# Patient Record
Sex: MALE | Race: WHITE | Employment: OTHER | URBAN - METROPOLITAN AREA
[De-identification: names, ages, dates, MRNs, and addresses within clinical notes are randomized per-mention and may not be internally consistent; named-entity substitution may affect disease eponyms.]

---

## 2018-08-26 ENCOUNTER — HOSPITAL ENCOUNTER (OUTPATIENT)
Age: 33
Discharge: HOME | End: 2018-08-26
Payer: MEDICARE

## 2018-08-26 LAB
ALBUMIN SERPL-MCNC: 4.7 G/DL (ref 3.5–5.2)
ALP BLD-CCNC: 94 U/L (ref 40–129)
ALT SERPL-CCNC: 13 U/L (ref 0–40)
ANION GAP SERPL CALCULATED.3IONS-SCNC: 12 MMOL/L (ref 7–16)
AST SERPL-CCNC: 21 U/L (ref 0–39)
BASOPHILS ABSOLUTE: 0.02 E9/L (ref 0–0.2)
BASOPHILS RELATIVE PERCENT: 0.2 % (ref 0–2)
BILIRUB SERPL-MCNC: 0.5 MG/DL (ref 0–1.2)
BUN BLDV-MCNC: 8 MG/DL (ref 6–20)
CALCIUM SERPL-MCNC: 10.5 MG/DL (ref 8.6–10.2)
CHLORIDE BLD-SCNC: 100 MMOL/L (ref 98–107)
CO2: 30 MMOL/L (ref 22–29)
CREAT SERPL-MCNC: 0.6 MG/DL (ref 0.7–1.2)
EOSINOPHILS ABSOLUTE: 0.02 E9/L (ref 0.05–0.5)
EOSINOPHILS RELATIVE PERCENT: 0.2 % (ref 0–6)
GFR AFRICAN AMERICAN: >60
GFR NON-AFRICAN AMERICAN: >60 ML/MIN/1.73
GLUCOSE BLD-MCNC: 101 MG/DL (ref 74–109)
HCT VFR BLD CALC: 46.2 % (ref 37–54)
HEMOGLOBIN: 15.8 G/DL (ref 12.5–16.5)
IMMATURE GRANULOCYTES #: 0.03 E9/L
IMMATURE GRANULOCYTES %: 0.2 % (ref 0–5)
LYMPHOCYTES ABSOLUTE: 1.4 E9/L (ref 1.5–4)
LYMPHOCYTES RELATIVE PERCENT: 11.2 % (ref 20–42)
MCH RBC QN AUTO: 34.1 PG (ref 26–35)
MCHC RBC AUTO-ENTMCNC: 34.2 % (ref 32–34.5)
MCV RBC AUTO: 99.8 FL (ref 80–99.9)
MONOCYTES ABSOLUTE: 1.15 E9/L (ref 0.1–0.95)
MONOCYTES RELATIVE PERCENT: 9.2 % (ref 2–12)
NEUTROPHILS ABSOLUTE: 9.91 E9/L (ref 1.8–7.3)
NEUTROPHILS RELATIVE PERCENT: 79 % (ref 43–80)
PDW BLD-RTO: 12.3 FL (ref 11.5–15)
PLATELET # BLD: 132 E9/L (ref 130–450)
PMV BLD AUTO: 13 FL (ref 7–12)
POTASSIUM SERPL-SCNC: 4.6 MMOL/L (ref 3.5–5)
RBC # BLD: 4.63 E12/L (ref 3.8–5.8)
SODIUM BLD-SCNC: 142 MMOL/L (ref 132–146)
TOTAL PROTEIN: 8.7 G/DL (ref 6.4–8.3)
VALPROIC ACID LEVEL: 86 MCG/ML (ref 50–100)
WBC # BLD: 12.5 E9/L (ref 4.5–11.5)

## 2018-08-26 PROCEDURE — 80168 ASSAY OF ETHOSUXIMIDE: CPT

## 2018-08-26 PROCEDURE — 80053 COMPREHEN METABOLIC PANEL: CPT

## 2018-08-26 PROCEDURE — 36415 COLL VENOUS BLD VENIPUNCTURE: CPT

## 2018-08-26 PROCEDURE — 80164 ASSAY DIPROPYLACETIC ACD TOT: CPT

## 2018-08-26 PROCEDURE — 85025 COMPLETE CBC W/AUTO DIFF WBC: CPT

## 2019-01-09 ENCOUNTER — HOSPITAL ENCOUNTER (EMERGENCY)
Age: 34
Discharge: HOME | End: 2019-01-09
Payer: MEDICARE

## 2019-01-09 VITALS
OXYGEN SATURATION: 98 % | TEMPERATURE: 97.8 F | DIASTOLIC BLOOD PRESSURE: 80 MMHG | SYSTOLIC BLOOD PRESSURE: 110 MMHG | RESPIRATION RATE: 16 BRPM | WEIGHT: 88 LBS | HEART RATE: 96 BPM | BODY MASS INDEX: 17.19 KG/M2

## 2019-01-09 DIAGNOSIS — R33.9 URINARY RETENTION: Primary | ICD-10-CM

## 2019-01-09 LAB
BILIRUBIN URINE: NEGATIVE
BLOOD, URINE: NEGATIVE
CLARITY: CLEAR
COLOR: YELLOW
GLUCOSE URINE: NEGATIVE MG/DL
KETONES, URINE: NEGATIVE MG/DL
LEUKOCYTE ESTERASE, URINE: NEGATIVE
NITRITE, URINE: NEGATIVE
PH UA: 5.5 (ref 5–9)
PROTEIN UA: NEGATIVE MG/DL
SPECIFIC GRAVITY UA: >=1.03 (ref 1–1.03)
UROBILINOGEN, URINE: 0.2 E.U./DL

## 2019-01-09 PROCEDURE — 81003 URINALYSIS AUTO W/O SCOPE: CPT

## 2019-01-09 PROCEDURE — 99283 EMERGENCY DEPT VISIT LOW MDM: CPT

## 2019-01-09 PROCEDURE — 87088 URINE BACTERIA CULTURE: CPT

## 2019-01-11 LAB — URINE CULTURE, ROUTINE: NORMAL

## 2019-01-12 PROBLEM — R33.9 URINARY RETENTION: Status: ACTIVE | Noted: 2019-01-12

## 2019-08-19 ENCOUNTER — HOSPITAL ENCOUNTER (OUTPATIENT)
Age: 34
Discharge: HOME | End: 2019-08-19
Payer: MEDICARE

## 2019-08-19 LAB — VALPROIC ACID LEVEL: 98 MCG/ML (ref 50–100)

## 2019-08-19 PROCEDURE — 80164 ASSAY DIPROPYLACETIC ACD TOT: CPT

## 2019-08-19 PROCEDURE — 36415 COLL VENOUS BLD VENIPUNCTURE: CPT

## 2019-08-19 PROCEDURE — 80168 ASSAY OF ETHOSUXIMIDE: CPT

## 2019-08-21 DIAGNOSIS — G80.8 OTHER CEREBRAL PALSY (HCC): Primary | ICD-10-CM

## 2019-10-07 ENCOUNTER — TELEPHONE (OUTPATIENT)
Dept: PRIMARY CARE CLINIC | Age: 34
End: 2019-10-07

## 2019-12-09 ENCOUNTER — OFFICE VISIT (OUTPATIENT)
Dept: PRIMARY CARE CLINIC | Age: 34
End: 2019-12-09
Payer: MEDICARE

## 2019-12-09 VITALS — SYSTOLIC BLOOD PRESSURE: 120 MMHG | DIASTOLIC BLOOD PRESSURE: 72 MMHG

## 2019-12-09 DIAGNOSIS — Q43.1 HIRSCHSPRUNG'S DISEASE: ICD-10-CM

## 2019-12-09 DIAGNOSIS — F72 SEVERE INTELLECTUAL DISABILITIES: ICD-10-CM

## 2019-12-09 DIAGNOSIS — G80.8 OTHER CEREBRAL PALSY (HCC): ICD-10-CM

## 2019-12-09 DIAGNOSIS — R13.19 OTHER DYSPHAGIA: ICD-10-CM

## 2019-12-09 DIAGNOSIS — Q24.3 PULMONARY INFUNDIBULAR STENOSIS: ICD-10-CM

## 2019-12-09 DIAGNOSIS — R21 RASH: ICD-10-CM

## 2019-12-09 DIAGNOSIS — G40.909 SEIZURE DISORDER (HCC): Primary | ICD-10-CM

## 2019-12-09 DIAGNOSIS — D69.6 THROMBOCYTOPENIA, UNSPECIFIED (HCC): ICD-10-CM

## 2019-12-09 DIAGNOSIS — Z00.00 HEALTH MAINTENANCE EXAMINATION: ICD-10-CM

## 2019-12-09 PROBLEM — R56.9 UNSPECIFIED CONVULSIONS (HCC): Status: ACTIVE | Noted: 2019-12-09

## 2019-12-09 PROCEDURE — G8419 CALC BMI OUT NRM PARAM NOF/U: HCPCS | Performed by: FAMILY MEDICINE

## 2019-12-09 PROCEDURE — 99215 OFFICE O/P EST HI 40 MIN: CPT | Performed by: FAMILY MEDICINE

## 2019-12-09 PROCEDURE — 4004F PT TOBACCO SCREEN RCVD TLK: CPT | Performed by: FAMILY MEDICINE

## 2019-12-09 PROCEDURE — G8484 FLU IMMUNIZE NO ADMIN: HCPCS | Performed by: FAMILY MEDICINE

## 2019-12-09 PROCEDURE — G8427 DOCREV CUR MEDS BY ELIG CLIN: HCPCS | Performed by: FAMILY MEDICINE

## 2019-12-09 RX ORDER — POTASSIUM CHLORIDE 10 MEQ
5 TABLET, EXTENDED RELEASE ORAL DAILY
COMMUNITY

## 2019-12-09 RX ORDER — ETHOSUXIMIDE 250 MG/5ML
7.5 SOLUTION ORAL 2 TIMES DAILY
COMMUNITY

## 2019-12-09 RX ORDER — ESOMEPRAZOLE MAGNESIUM 20 MG/1
20 FOR SUSPENSION ORAL DAILY
COMMUNITY
End: 2019-12-16 | Stop reason: SDUPTHER

## 2019-12-09 RX ORDER — ALOE VERA/COLLAGEN
FOAM (ML) TOPICAL
COMMUNITY

## 2019-12-16 RX ORDER — ESOMEPRAZOLE MAGNESIUM 20 MG/1
FOR SUSPENSION ORAL
Qty: 30 PACKET | Refills: 12 | Status: SHIPPED
Start: 2019-12-16 | End: 2021-01-14 | Stop reason: SDUPTHER

## 2020-01-08 PROBLEM — Z00.00 HEALTH MAINTENANCE EXAMINATION: Status: RESOLVED | Noted: 2019-12-09 | Resolved: 2020-01-08

## 2020-01-25 ENCOUNTER — HOSPITAL ENCOUNTER (OUTPATIENT)
Age: 35
Discharge: HOME | End: 2020-01-25
Payer: MEDICARE

## 2020-01-25 LAB
ALBUMIN SERPL-MCNC: 4.8 G/DL (ref 3.5–5.2)
ALP BLD-CCNC: 100 U/L (ref 40–129)
ALT SERPL-CCNC: 12 U/L (ref 0–40)
ANION GAP SERPL CALCULATED.3IONS-SCNC: 11 MMOL/L (ref 7–16)
AST SERPL-CCNC: 22 U/L (ref 0–39)
BASOPHILS ABSOLUTE: 0.04 E9/L (ref 0–0.2)
BASOPHILS RELATIVE PERCENT: 0.5 % (ref 0–2)
BILIRUB SERPL-MCNC: 0.3 MG/DL (ref 0–1.2)
BUN BLDV-MCNC: 10 MG/DL (ref 6–20)
CALCIUM SERPL-MCNC: 10.6 MG/DL (ref 8.6–10.2)
CHLORIDE BLD-SCNC: 101 MMOL/L (ref 98–107)
CHOLESTEROL, TOTAL: 119 MG/DL (ref 0–199)
CO2: 32 MMOL/L (ref 22–29)
CREAT SERPL-MCNC: 0.6 MG/DL (ref 0.7–1.2)
EOSINOPHILS ABSOLUTE: 0.04 E9/L (ref 0.05–0.5)
EOSINOPHILS RELATIVE PERCENT: 0.5 % (ref 0–6)
GFR AFRICAN AMERICAN: >60
GFR NON-AFRICAN AMERICAN: >60 ML/MIN/1.73
GLUCOSE BLD-MCNC: 93 MG/DL (ref 74–99)
HCT VFR BLD CALC: 45.6 % (ref 37–54)
HDLC SERPL-MCNC: 52 MG/DL
HEMOGLOBIN: 15.2 G/DL (ref 12.5–16.5)
IMMATURE GRANULOCYTES #: 0.02 E9/L
IMMATURE GRANULOCYTES %: 0.3 % (ref 0–5)
LDL CHOLESTEROL CALCULATED: 49 MG/DL (ref 0–99)
LYMPHOCYTES ABSOLUTE: 3.18 E9/L (ref 1.5–4)
LYMPHOCYTES RELATIVE PERCENT: 41.5 % (ref 20–42)
MCH RBC QN AUTO: 33.5 PG (ref 26–35)
MCHC RBC AUTO-ENTMCNC: 33.3 % (ref 32–34.5)
MCV RBC AUTO: 100.4 FL (ref 80–99.9)
MONOCYTES ABSOLUTE: 1.08 E9/L (ref 0.1–0.95)
MONOCYTES RELATIVE PERCENT: 14.1 % (ref 2–12)
NEUTROPHILS ABSOLUTE: 3.3 E9/L (ref 1.8–7.3)
NEUTROPHILS RELATIVE PERCENT: 43.1 % (ref 43–80)
PDW BLD-RTO: 12.3 FL (ref 11.5–15)
PLATELET # BLD: 131 E9/L (ref 130–450)
PMV BLD AUTO: 13.6 FL (ref 7–12)
POTASSIUM SERPL-SCNC: 4.9 MMOL/L (ref 3.5–5)
RBC # BLD: 4.54 E12/L (ref 3.8–5.8)
SODIUM BLD-SCNC: 144 MMOL/L (ref 132–146)
T4 FREE: 1.04 NG/DL (ref 0.93–1.7)
TOTAL PROTEIN: 8 G/DL (ref 6.4–8.3)
TRIGL SERPL-MCNC: 89 MG/DL (ref 0–149)
TSH SERPL DL<=0.05 MIU/L-ACNC: 2.38 UIU/ML (ref 0.27–4.2)
VLDLC SERPL CALC-MCNC: 18 MG/DL
WBC # BLD: 7.7 E9/L (ref 4.5–11.5)

## 2020-01-25 PROCEDURE — 85025 COMPLETE CBC W/AUTO DIFF WBC: CPT

## 2020-01-25 PROCEDURE — 84443 ASSAY THYROID STIM HORMONE: CPT

## 2020-01-25 PROCEDURE — 80053 COMPREHEN METABOLIC PANEL: CPT

## 2020-01-25 PROCEDURE — 36415 COLL VENOUS BLD VENIPUNCTURE: CPT

## 2020-01-25 PROCEDURE — 80061 LIPID PANEL: CPT

## 2020-01-25 PROCEDURE — 84439 ASSAY OF FREE THYROXINE: CPT

## 2020-01-27 NOTE — RESULT ENCOUNTER NOTE
Calcium mildly elevated , as is mcv. Repeat bw . Could be a hydration issue with calcium. b12 def can cause elevated mcv. Repeat bw/additional bw if willing. Notify if sxs.

## 2020-02-07 ENCOUNTER — TELEPHONE (OUTPATIENT)
Dept: PRIMARY CARE CLINIC | Age: 35
End: 2020-02-07

## 2020-02-10 ENCOUNTER — HOSPITAL ENCOUNTER (OUTPATIENT)
Age: 35
Discharge: HOME | End: 2020-02-12
Payer: MEDICARE

## 2020-02-10 ENCOUNTER — HOSPITAL ENCOUNTER (OUTPATIENT)
Dept: GENERAL RADIOLOGY | Age: 35
Discharge: HOME | End: 2020-02-12
Payer: MEDICARE

## 2020-02-10 ENCOUNTER — OFFICE VISIT (OUTPATIENT)
Dept: PRIMARY CARE CLINIC | Age: 35
End: 2020-02-10
Payer: MEDICARE

## 2020-02-10 VITALS — RESPIRATION RATE: 16 BRPM | DIASTOLIC BLOOD PRESSURE: 80 MMHG | SYSTOLIC BLOOD PRESSURE: 126 MMHG | TEMPERATURE: 98.7 F

## 2020-02-10 PROBLEM — R05.9 COUGH: Status: ACTIVE | Noted: 2020-02-10

## 2020-02-10 PROBLEM — E83.42 HYPOMAGNESEMIA: Status: ACTIVE | Noted: 2020-02-10

## 2020-02-10 PROBLEM — D75.89 MACROCYTOSIS: Status: ACTIVE | Noted: 2020-02-10

## 2020-02-10 PROBLEM — R11.10 DRY HEAVES: Status: ACTIVE | Noted: 2020-02-10

## 2020-02-10 PROBLEM — R33.9 URINARY RETENTION: Status: ACTIVE | Noted: 2020-02-10

## 2020-02-10 PROBLEM — R41.82 ALTERED MENTAL STATUS: Status: ACTIVE | Noted: 2020-02-10

## 2020-02-10 PROBLEM — K43.5 PARASTOMAL HERNIA WITHOUT OBSTRUCTION OR GANGRENE: Status: ACTIVE | Noted: 2020-02-10

## 2020-02-10 PROBLEM — E83.52 HYPERCALCEMIA: Status: ACTIVE | Noted: 2020-02-10

## 2020-02-10 PROCEDURE — G8427 DOCREV CUR MEDS BY ELIG CLIN: HCPCS | Performed by: FAMILY MEDICINE

## 2020-02-10 PROCEDURE — 71046 X-RAY EXAM CHEST 2 VIEWS: CPT

## 2020-02-10 PROCEDURE — G8421 BMI NOT CALCULATED: HCPCS | Performed by: FAMILY MEDICINE

## 2020-02-10 PROCEDURE — 1036F TOBACCO NON-USER: CPT | Performed by: FAMILY MEDICINE

## 2020-02-10 PROCEDURE — 99215 OFFICE O/P EST HI 40 MIN: CPT | Performed by: FAMILY MEDICINE

## 2020-02-10 PROCEDURE — G8484 FLU IMMUNIZE NO ADMIN: HCPCS | Performed by: FAMILY MEDICINE

## 2020-02-10 RX ORDER — IODINE/SODIUM IODIDE 2 %
TINCTURE TOPICAL
COMMUNITY
Start: 2019-12-27 | End: 2020-02-12 | Stop reason: SDUPTHER

## 2020-02-10 ASSESSMENT — PATIENT HEALTH QUESTIONNAIRE - PHQ9
SUM OF ALL RESPONSES TO PHQ QUESTIONS 1-9: 0
2. FEELING DOWN, DEPRESSED OR HOPELESS: 0
1. LITTLE INTEREST OR PLEASURE IN DOING THINGS: 0
SUM OF ALL RESPONSES TO PHQ QUESTIONS 1-9: 0
SUM OF ALL RESPONSES TO PHQ9 QUESTIONS 1 & 2: 0

## 2020-02-10 NOTE — PROGRESS NOTES
19  Lucy García : 1985 Sex: male  Age: 30 y.o. Chief Complaint   Patient presents with    Urinary Retention    Fatigue         HPI:       CC: Patient presents for fu sxs and review blood work      HPI: Here with mom and caretaker. Patient developed symptoms of lethargy, was not acting right, was dry heaving after G-tube feeds, does not have emesis with history of Nissen's fundoplication. Acted tired. Seem to be in urinary retention. No known fever. Instructed go to the ER. They did not. This saw the urologist who has a renal ultrasound pending and a UA/culture pending but difficulty getting the sample. Cath option reviewed-declines now. All of his symptoms seem back to baseline. He is acting himself. He does have a intermittent cough in the office today but they feel he does this more to \"joke around\". He is n.p.o. He seems to be tolerating his feeds well. Urination seems back to baseline. Cough is nonproductive. No other complaints or concerns this time and again seems back to baseline    Mom thinks she \"freaked the phone girl out\" when she stated he was lethargic although she does describe him to sound lethargic at the time. I also discussed with his nonverbal status and other medical conditions that if he is \"not acting right\" he should be assessed through the ER with a large differential where they could do chest imaging relevant ammonia, UA and culture to rule out UTI, retention, blood work etc. and she understands    Blood work reviewed. CO2 32, calcium went from 10.5-10. 6. He is on [employer]" twice a day. They are not sure the dosing. They are going to back it down to once a day and repeat BMP in 1 to 2 weeks. HDL 52 LDL 49 triglyceride 89 LFTs normal TSH 2.380, free T4 1.04,CBC grossly normal except .4,      Mom says his stoma appears \"puffy\" around it-declines examination today. Again all acute symptoms resolved.   In hindsight she thinks possibly a viral inspection. Eyes: No discharge from the eyes. Sclerae clear. ENMT: Ears clear nose clear rhinorrhea oropharynx MMM  Neck: Supple. Palpation reveals no adenopathy. No masses appreciated. No JVD. Resp: Respirations are unlabored. Clear to auscultation. No rales, rhonchi or wheezes  appreciated over the lungs bilaterally. CV: Rate is regular. Rhythm is regular. Extremities: No clubbing or cyanosis. No edema of the lower limbs bilaterally. No calf  inflammation or tenderness. Abdomen: Bowel sounds are normoactive. Abdomen is soft, nontender, and nondistended. No  abdominal masses. No palpable hepatosplenomegaly. G-tube without surrounding irritation. Skin: Dry and warm with no rash. Neuro: without acute change. Braces both legs. Nonverbal.      Assessment and Plan:   Diagnosis Orders   1. Hypercalcemia  Basic Metabolic Panel    Comprehensive Metabolic Panel   2. Cough  XR CHEST STANDARD (2 VW)   3. Dry heaves     4. Urinary retention     5. Altered mental status, unspecified altered mental status type  XR CHEST STANDARD (2 VW)   6. Other cerebral palsy (HCC)  Comprehensive Metabolic Panel    XR CHEST STANDARD (2 VW)   7. Hirschsprung's disease     8. Pulmonary infundibular stenosis     9. Thrombocytopenia, unspecified (HCC)  CBC Auto Differential   10. Seizure disorder (HCC)  Comprehensive Metabolic Panel   11. Other dysphagia  XR CHEST STANDARD (2 VW)   12. Severe intellectual disabilities  Comprehensive Metabolic Panel   13. Macrocytosis  Vitamin B12 & Folate    CBC Auto Differential   14. Hypomagnesemia  Magnesium    Magnesium   15. Parastomal hernia without obstruction or gangrene  Ambulatory referral to General Surgery   16. Rash         Other cerebral palsy ([facility]  Counseled. Risk of complications reviewed. Care appears to be excellent. Forms completed. Hirschsprung's disease  He no longer sees GI. Mom has some questions on his stoma. We will refer Dr. Lorena Ware.   Pulmonary infundibular insurance company to ensure coverage and to fully understand benefits and cost prior to any testing. This note was created with the assistance of voice recognition software. Document was reviewed however may contain grammatical errors. 19  Dani Landeros : 1985 Sex: male  Age: 30 y.o. Chief Complaint   Patient presents with    Urinary Retention    Fatigue         HPI:       CC: Patient presents for Dana Jc  HPI: Here with mom. Does have guardianship forms today. No acute symptoms. Doing well overall. chronic mild cough. no change. overall stable. Skin around G-tube cleared instantly on the compounded formula as below. Mom uses it as needed. Did not respond to cornstarch or nystatin. mom says cleared from pul stanpoint. not intested in imaging at this time. , , No acute symptoms. Has  Is NPO. G-tube feeds only. Follows with [facility] neurology. Had some breakthrough seizures this summer, recent levels normal and follow with neurology. No shortness of breath or  wheezing. No fever or chills. Declines Flu shot counseled on recommendations as well as other vaccines. Had Tdap . Had Pneumovax  and we did discuss booster as well as Prevnar 13  the Pneumovax and Prevnar 13 x 1 year.  had chicken pox. Does not see  Dr. Damaris Calhoun, gastrointestinal at [facility] any longer. Saw Cardiology at [facility] , said no Pulmonary stenosis despite being told that his whole life, no cardiac issues, fu prn. ? Trouble visualizing. .Nonverbal , so  difficult to assess in that respect, but no acute changes or concerns per Mom. Appears to be providing  excellent care. Mom will look into shot record for dates of childhood immunizations and provide this to us as well. Has 6 cans of nutrition per day through the G-tube      GERD no acute symptoms.   Does not seem to have any symptomatology consistent with headaches or acute neurologic deficit, does not portray symptoms of shortness of breath, chest pain, abdominal discomfort, vomiting, no change in bowels, no melena or hematochezia. .  Had some change in urinary habits, difficulty going but it was negatively assessed by Dr. Jessie Lake with a negative ultrasound and doing well at this time. No gross hematuria. No rash or skin lesions. Past Medical History: Again, extensive including cerebral palsy, profound mental retardation, nonver bal,  history of Hirschsprung's Disease, colostomy, feeding tube, seizure disorder, history of valvular disease,  mild pulmonic stenosis (Neg 2017 but difficult exam), chronic allergic rhinitis, thrombocytopenia,  hypercalcemia, lympho cytosis, macrocytosis. [address]: Dad  lymphoma/pneumonia 66    Social: living with guardian and forms filled out today.             Current Outpatient Medications:     Incontinence Supply Disposable ([employer] FITTED BRIEFS DAY/NIGHT MD) MISC, USE AS DIRECTED, Disp: , Rfl:     esomeprazole Magnesium (NEXIUM) 20 MG PACK, 1 per g tube every day, Disp: 30 packet, Rfl: 12    Ostomy Supplies (STOMAHESIVE PROTECTIVE) POWD, by Other route Apply to affected area of stoma, Disp: , Rfl:     Zinc Oxide (BALMEX EX), Apply topically Apply to christal site, Disp: , Rfl:     Hydrocortisone (PREPARATION H EX), Apply topically Twice a day as needed, Disp: , Rfl:     Oral Electrolytes (PEDIALYTE PO), Take by mouth prn, Disp: , Rfl:     Nutritional Supplements (ENSURE PO), Take by mouth 6 cans a day, Disp: , Rfl:     loratadine 5 MG/5ML solution, Take 5 mg by mouth daily 10 ml GT QD, Disp: , Rfl:     valproate (DEPAKENE) 250 MG/5ML solution, Take by mouth 3 times daily 5.0 ml tid, Disp: , Rfl:     ethosuximide (ZARONTIN) 250 MG/5ML solution, Take 7.5 mLs by mouth 2 times daily, Disp: , Rfl:     Calcium Carb-Cholecalciferol (CALCIUM-VITAMIN D) 500-200 MG-UNIT per tablet, Take 1 tablet by mouth 2 times daily (with meals), Disp: , Rfl:   Allergies   Allergen Reactions    Lactose     Lactose Intolerance (Gi)     Other     Seasonal        Past Medical History:   Diagnosis Date    Cerebral palsy ([address]     Hirschsprung disease     Hypospadias     Mental retardation     Pulmonic stenosis      Past Surgical History:   Procedure Laterality Date    COLOSTOMY      FOOT SURGERY      GASTROSTOMY TUBE PLACEMENT      PATENT DUCTUS ARTERIOUS LIGATION      TESTICLE REMOVAL       No family history on file. Social History     Tobacco Use    Smoking status: Never Smoker    Smokeless tobacco: Never Used   Substance Use Topics    Alcohol use: No    Drug use: No      Social History     Patient does not qualify to have social determinant information on file (likely too young). Social History Narrative    PMH:    Problem List: Infantile cerebral palsy, Congenital heart disease, Thrombocytopenic disorder, Seizure,    Severe mental retardation (I.Q. 20-34)         cerebral palsy, profound mental retardation, nonverbal,    history of Hirschsprung's Disease, colostomy, feeding tube, seizure disorder, history of valvular disease,    mild pulmonic stenosis (recent neg but difficult exam), chronic allergic rhinitis, thrombocytopenia,    hypercalcemia, lympho cytosis, macrocytosis. [address]: Dad  lymphoma/pneumonia 66        SH:    . (Marital)reviewed    Personal Habits: Cigarette Use: Nonsmoker. . (Alcohol)        Vitals:    02/10/20 1404   BP: 126/80   Resp: 16   Temp: 98.7 °F (37.1 °C)   TempSrc: Temporal   Weight: Comment: pt in wheelchair      Wt Readings from Last 3 Encounters:   19 88 lb (39.9 kg)   03/10/17 88 lb (39.9 kg)            Exam:  Const: Appears comfortable. No signs of acute distress present. Head/Face: Atraumatic on inspection. Eyes: No discharge from the eyes. Sclerae clear. ENMT: Ears clear nose clear rhinorrhea oropharynx MMM  Neck: Supple. Palpation reveals no adenopathy. No masses appreciated. No JVD. Resp: Respirations are unlabored. Clear to auscultation.  No rales, rhonchi or wheezes  appreciated over the lungs bilaterally. CV: Rate is regular. Rhythm is regular. Extremities: No clubbing or cyanosis. No edema of the lower limbs bilaterally. No calf  inflammation or tenderness. Abdomen: Bowel sounds are normoactive. Abdomen is soft, nontender, and nondistended. No  abdominal masses. No palpable hepatosplenomegaly. G-tube . Skin: Dry and warm with no rash. Neuro: without acute change. Braces both legs. Nonverbal.      Assessment and Plan:   Diagnosis Orders   1. Hypercalcemia  Basic Metabolic Panel    Comprehensive Metabolic Panel   2. Cough  XR CHEST STANDARD (2 VW)   3. Dry heaves     4. Urinary retention     5. Altered mental status, unspecified altered mental status type  XR CHEST STANDARD (2 VW)   6. Other cerebral palsy (HCC)  Comprehensive Metabolic Panel    XR CHEST STANDARD (2 VW)   7. Hirschsprung's disease     8. Pulmonary infundibular stenosis     9. Thrombocytopenia, unspecified (HCC)  CBC Auto Differential   10. Seizure disorder ([facility])  Comprehensive Metabolic Panel   11. Other dysphagia  XR CHEST STANDARD (2 VW)   12. Severe intellectual disabilities  Comprehensive Metabolic Panel   13. Macrocytosis  Vitamin B12 & Folate    CBC Auto Differential   14. Hypomagnesemia  Magnesium    Magnesium   15. Parastomal hernia without obstruction or gangrene  Ambulatory referral to General Surgery   16. Rash         Other cerebral palsy ([address]  Counseled. Risk of complications reviewed. Care appears to be excellent. Forms completed. Hirschsprung's disease  He should continue per GI    Pulmonary infundibular stenosis  Pulmonary stenosis. Continue per cardiology. Last echo apparently did not show it,  but mom not sure that they got a clear picture. Thrombocytopenia, unspecified ([address]  Has been stable. Seizure disorder ([address]  Continue per neurology. Mom defers levels to them. Tolerating medications. ADRs interactions reviewed. Seizure precautions.     Other dysphagia  NPO. Has G-tube    Health maintenance examination  Counseled  12/19. Rash  Chronic intermittent irritation around the G-tube but does very well with compound cream through: Pharmacy including Maalox/Aquaphor/zinc oxide/nystatin/0.5% hydrocortisone, not for long-term use, may use 1 to 2 weeks on/1 week off as needed no current symptoms of the     Cough  Counseled extensively. Differential reviewed, including serious etiologies. He is n.p.o. Had Nissen's fundoplication. Still discussed risk of aspiration and other pulmonary causes. They think he is \"joking around\"-they are agreeable to a chest x-ray at another facility, but declines otherwise. Dry heaves  Resolved. They feel possibly viral gastroenteritis as it was self-limited    Urinary retention  Seems back to baseline. Follows with Dr. Alexandre Hill. Has renal ultrasound pending. Has UA/culture pending. Declines cath sample. Altered mental status  Back to baseline. Other than as noted declines otherwise now. Macrocytosis  Check X62 folic acid with next blood work. Hypomagnesemia  On \"cow mag\" twice a day-lowered to once a day because of hypercalcemia. Monitor. Parastomal hernia without obstruction or gangrene  Declines examination now. Would like refer Dr. Jose D Tyler to rule out parastomal hernia versus otherwise. No flowsheet data found. Plan as above. Counseled extensively and differential diagnoses relevant to above were reviewed, including serious etiologies. Side effects and interactions of medications were reviewed. Check chest x-ray. -Decrease Hua mag from twice a day to once a day  -Check BMP magnesium V57 and folic acid in 1 to 2 weeks  -Check CBC BMP magnesium in 3 months as well as parathyroid, ionized calcium vitamin B12 vitamin D and follow-up then sooner as needed  -Await renal ultrasound to continue with Dr. Alexandre Hill  -Refer to Dr. Christos Mariscal  -Low threshold for ER. Precautions reviewed.   Unfortunately with

## 2020-02-10 NOTE — ASSESSMENT & PLAN NOTE
Declines examination now. Would like refer Dr. Caron Byers to rule out parastomal hernia versus otherwise.

## 2020-02-10 NOTE — ASSESSMENT & PLAN NOTE
Seems back to baseline. Follows with Dr. Michelle Collins. Has renal ultrasound pending. Has UA/culture pending. Declines cath sample.

## 2020-02-11 ENCOUNTER — TELEPHONE (OUTPATIENT)
Dept: SURGERY | Age: 35
End: 2020-02-11

## 2020-02-11 NOTE — TELEPHONE ENCOUNTER
1st attempt to make an appointment for a referral to Dr. Anabelle Winters. Left a message with contact information to call the office back.     Electronically signed by Araceli Araiza on 2/11/20 at 9:27 AM

## 2020-02-12 RX ORDER — IODINE/SODIUM IODIDE 2 %
TINCTURE TOPICAL
Qty: 200 EACH | Refills: 12 | Status: SHIPPED
Start: 2020-02-12 | End: 2022-06-06 | Stop reason: SDUPTHER

## 2020-02-20 ENCOUNTER — OFFICE VISIT (OUTPATIENT)
Dept: SURGERY | Age: 35
End: 2020-02-20
Payer: MEDICARE

## 2020-02-20 PROCEDURE — 1036F TOBACCO NON-USER: CPT | Performed by: SURGERY

## 2020-02-20 PROCEDURE — G8484 FLU IMMUNIZE NO ADMIN: HCPCS | Performed by: SURGERY

## 2020-02-20 PROCEDURE — G8421 BMI NOT CALCULATED: HCPCS | Performed by: SURGERY

## 2020-02-20 PROCEDURE — G8427 DOCREV CUR MEDS BY ELIG CLIN: HCPCS | Performed by: SURGERY

## 2020-02-20 PROCEDURE — 99203 OFFICE O/P NEW LOW 30 MIN: CPT | Performed by: SURGERY

## 2020-02-27 ENCOUNTER — TELEPHONE (OUTPATIENT)
Dept: SURGERY | Age: 35
End: 2020-02-27

## 2020-02-27 NOTE — TELEPHONE ENCOUNTER
MA called [facility] to get the CT Abd/Pelv without contrast approved. MA talked to a [place] L and she stated it needed prior [facility]. It got approved for the dates 2/27/20 to 3/27/20.     Auth # [id number]    Electronically signed by Rivera Norris on 2/27/20 at 11:21 AM

## 2020-02-28 ENCOUNTER — TELEPHONE (OUTPATIENT)
Dept: SURGERY | Age: 35
End: 2020-02-28

## 2020-02-28 NOTE — TELEPHONE ENCOUNTER
Patient is scheduled at [facility] on 3/4/20 at 2:15 PM. MA attempted to call the patient for the second attempt but had to leave a voicemail to call the office back.     Electronically signed by Akbar Esparza on 2/28/20 at 3:29 PM

## 2020-03-02 ENCOUNTER — TELEPHONE (OUTPATIENT)
Dept: SURGERY | Age: 35
End: 2020-03-02

## 2020-03-02 NOTE — PROGRESS NOTES
EX) Apply topically Apply to christal site   Yes Historical Provider, MD   Hydrocortisone (PREPARATION H EX) Apply topically Twice a day as needed   Yes Historical Provider, MD   Oral Electrolytes (PEDIALYTE PO) Take by mouth prn   Yes Historical Provider, MD   Nutritional Supplements (ENSURE PO) Take by mouth 6 cans a day   Yes Historical Provider, MD   loratadine 5 MG/5ML solution Take 5 mg by mouth daily 10 ml GT QD   Yes Historical Provider, MD   valproate (DEPAKENE) 250 MG/5ML solution Take by mouth 3 times daily 5.0 ml tid   Yes Historical Provider, MD   ethosuximide (ZARONTIN) 250 MG/5ML solution Take 7.5 mLs by mouth 2 times daily   Yes Historical Provider, MD   Calcium Carb-Cholecalciferol (CALCIUM-VITAMIN D) 500-200 MG-UNIT per tablet Take 1 tablet by mouth 2 times daily (with meals)   Yes Historical Provider, MD       Allergies   Allergen Reactions    Lactose     Lactose Intolerance (Gi)     Other     Seasonal        Social History     Socioeconomic History    Marital status: Single     Spouse name: None    Number of children: None    Years of education: None    Highest education level: None   Occupational History    None   Social Needs    Financial resource strain: None    Food insecurity:     Worry: None     Inability: None    Transportation needs:     Medical: None     Non-medical: None   Tobacco Use    Smoking status: Never Smoker    Smokeless tobacco: Never Used   Substance and Sexual Activity    Alcohol use: No    Drug use: No    Sexual activity: None   Lifestyle    Physical activity:     Days per week: None     Minutes per session: None    Stress: None   Relationships    Social connections:     Talks on phone: None     Gets together: None     Attends [employer] service: None     Active member of club or organization: None     Attends meetings of clubs or organizations: None     Relationship status: None    Intimate partner violence:     Fear of current or ex partner: None Emotionally abused: None     Physically abused: None     Forced sexual activity: None   Other Topics Concern    None   Social History Narrative    PMH:    Problem List: Infantile cerebral palsy, Congenital heart disease, Thrombocytopenic disorder, Seizure,    Severe mental retardation (I.Q. 20-34)         cerebral palsy, profound mental retardation, nonverbal,    history of Hirschsprung's Disease, colostomy, feeding tube, seizure disorder, history of valvular disease,    mild pulmonic stenosis (recent neg but difficult exam), chronic allergic rhinitis, thrombocytopenia,    hypercalcemia, lympho cytosis, macrocytosis. [address]: Dad  lymphoma/pneumonia 66        SH:    . (Marital)reviewed    Personal Habits: Cigarette Use: Nonsmoker. . (Alcohol)       History reviewed. No pertinent family history. Review of Systems   All other systems reviewed and are negative. Objective: There were no vitals filed for this visit. Physical Exam  Constitutional:       General: He is not in acute distress. Appearance: He is not diaphoretic. HENT:      Head: Normocephalic and atraumatic. Eyes:      General:         Right eye: No discharge. Left eye: No discharge. Neck:      Trachea: No tracheal deviation. Cardiovascular:      Rate and Rhythm: Normal rate. Pulmonary:      Effort: Pulmonary effort is normal. No respiratory distress. Abdominal:      General: There is no distension. Palpations: Abdomen is soft. Tenderness: There is no abdominal tenderness. There is no guarding or rebound. Comments: There is a right-sided colostomy present. Around the colostomy is an apparent area of mild bulging. It is not tender. Skin:     General: Skin is warm and dry. Neurological:      Mental Status: He is alert and oriented to person, place, and time.                  Rip Joe MD  3/1/2020    NOTE: This report, in part or full,may have been transcribed using voice recognition software. Every effort was made to ensure accuracy; however, inadvertent computerized transcription errors may be present. Please excuse any transcriptional grammatical or spelling errors that may have escaped my editorial review.     CC: Bi Mari MD

## 2020-03-10 ENCOUNTER — TELEPHONE (OUTPATIENT)
Dept: PRIMARY CARE CLINIC | Age: 35
End: 2020-03-10

## 2020-03-11 PROBLEM — R05.9 COUGH: Status: RESOLVED | Noted: 2020-02-10 | Resolved: 2020-03-11

## 2020-03-12 ENCOUNTER — TELEPHONE (OUTPATIENT)
Dept: SURGERY | Age: 35
End: 2020-03-12

## 2020-03-12 NOTE — TELEPHONE ENCOUNTER
MA called DM to see if the patient got the CT Abdomen/Pelvis WO contrast done on 3/4/20. They stated he did not get it done because he would not stay still for it to get done. MA talked to Dr. Ricky Ness and he said the hernia can be monitored and if it gets worse we will proceed with the CT again but Dr. Reid Winslow will need to call in something stronger for a sedation. MA attempted to call the patient's mother and had to leave a message to call the office back.     Electronically signed by Christine Moseley on 3/12/20 at 2:32 PM EDT

## 2020-03-13 ENCOUNTER — TELEPHONE (OUTPATIENT)
Dept: SURGERY | Age: 35
End: 2020-03-13

## 2020-03-13 NOTE — TELEPHONE ENCOUNTER
Patient's mother called back into the office and MA explained since the patient could not do the CT scan Dr. Taylor Maldonado stated to just monitor the hernia and if it gets worse we can try the CT scan again but with a stronger sedation medication which Dr. Leonela Thomas can call in  so it can be done. Patients' mother verbalized understanding.     Electronically signed by Shayna Mendoza on 3/13/20 at 1:22 PM EDT

## 2020-06-30 ENCOUNTER — TELEPHONE (OUTPATIENT)
Dept: PRIMARY CARE CLINIC | Age: 35
End: 2020-06-30

## 2020-06-30 NOTE — TELEPHONE ENCOUNTER
Mom is calling because they [facility] on [address] in [facility] and the insurance is requiring a prior authorization on the pt's shante pads

## 2020-07-02 NOTE — TELEPHONE ENCOUNTER
Left message for mom to call back; advised that we are having issues getting ahold of anyone with medicaid for his underpads. Wondering if there is a  or someone they have worked with before. Please let us know.

## 2021-01-04 ENCOUNTER — TELEPHONE (OUTPATIENT)
Dept: ADMINISTRATIVE | Age: 36
End: 2021-01-04

## 2021-01-04 DIAGNOSIS — R13.19 OTHER DYSPHAGIA: Primary | ICD-10-CM

## 2021-01-04 NOTE — TELEPHONE ENCOUNTER
Rommel Gutierrez called and said it is time to renew 56 [address] guardianship paperwork. She was asking if she got the paperwork to you, if this is a visit that could be done virtually?

## 2021-01-14 ENCOUNTER — VIRTUAL VISIT (OUTPATIENT)
Dept: PRIMARY CARE CLINIC | Age: 36
End: 2021-01-14
Payer: MEDICARE

## 2021-01-14 DIAGNOSIS — E83.52 HYPERCALCEMIA: ICD-10-CM

## 2021-01-14 DIAGNOSIS — D69.6 THROMBOCYTOPENIA, UNSPECIFIED (HCC): ICD-10-CM

## 2021-01-14 DIAGNOSIS — G80.8 OTHER CEREBRAL PALSY (HCC): Primary | ICD-10-CM

## 2021-01-14 DIAGNOSIS — G40.909 SEIZURE DISORDER (HCC): ICD-10-CM

## 2021-01-14 DIAGNOSIS — Q24.3 PULMONARY INFUNDIBULAR STENOSIS: ICD-10-CM

## 2021-01-14 DIAGNOSIS — Q43.1 HIRSCHSPRUNG'S DISEASE: ICD-10-CM

## 2021-01-14 DIAGNOSIS — F72 SEVERE INTELLECTUAL DISABILITIES: ICD-10-CM

## 2021-01-14 DIAGNOSIS — E83.42 HYPOMAGNESEMIA: ICD-10-CM

## 2021-01-14 DIAGNOSIS — R33.9 URINARY RETENTION: ICD-10-CM

## 2021-01-14 DIAGNOSIS — D75.89 MACROCYTOSIS: ICD-10-CM

## 2021-01-14 DIAGNOSIS — K21.9 GASTROESOPHAGEAL REFLUX DISEASE WITHOUT ESOPHAGITIS: ICD-10-CM

## 2021-01-14 DIAGNOSIS — R13.19 OTHER DYSPHAGIA: ICD-10-CM

## 2021-01-14 PROBLEM — R41.82 ALTERED MENTAL STATUS: Status: RESOLVED | Noted: 2020-02-10 | Resolved: 2021-01-14

## 2021-01-14 PROCEDURE — G8484 FLU IMMUNIZE NO ADMIN: HCPCS | Performed by: FAMILY MEDICINE

## 2021-01-14 PROCEDURE — 1036F TOBACCO NON-USER: CPT | Performed by: FAMILY MEDICINE

## 2021-01-14 PROCEDURE — G8427 DOCREV CUR MEDS BY ELIG CLIN: HCPCS | Performed by: FAMILY MEDICINE

## 2021-01-14 PROCEDURE — G8421 BMI NOT CALCULATED: HCPCS | Performed by: FAMILY MEDICINE

## 2021-01-14 PROCEDURE — 99215 OFFICE O/P EST HI 40 MIN: CPT | Performed by: FAMILY MEDICINE

## 2021-01-14 RX ORDER — ESOMEPRAZOLE MAGNESIUM 20 MG/1
FOR SUSPENSION ORAL
Qty: 30 PACKET | Refills: 12 | Status: SHIPPED
Start: 2021-01-14 | End: 2022-01-06 | Stop reason: SDUPTHER

## 2021-01-14 SDOH — ECONOMIC STABILITY: INCOME INSECURITY: HOW HARD IS IT FOR YOU TO PAY FOR THE VERY BASICS LIKE FOOD, HOUSING, MEDICAL CARE, AND HEATING?: PATIENT DECLINED

## 2021-01-14 SDOH — ECONOMIC STABILITY: TRANSPORTATION INSECURITY
IN THE PAST 12 MONTHS, HAS LACK OF TRANSPORTATION KEPT YOU FROM MEETINGS, WORK, OR FROM GETTING THINGS NEEDED FOR DAILY LIVING?: PATIENT DECLINED

## 2021-01-14 ASSESSMENT — PATIENT HEALTH QUESTIONNAIRE - PHQ9
SUM OF ALL RESPONSES TO PHQ QUESTIONS 1-9: 0
SUM OF ALL RESPONSES TO PHQ QUESTIONS 1-9: 0
SUM OF ALL RESPONSES TO PHQ9 QUESTIONS 1 & 2: 0
SUM OF ALL RESPONSES TO PHQ QUESTIONS 1-9: 0

## 2021-01-14 NOTE — PROGRESS NOTES
TeleMedicine Patient Consent    This visit was performed as a virtual video visit using a synchronous, two-way, audio-video telehealth technology platform. Patient identification was verified at the start of the visit, including the patient's telephone number and physical location. I discussed with the patient the nature of our telehealth visits, that:     1. Due to the nature of an audio- video modality, the only components of a physical exam that could be done are the elements supported by direct observation. 2. I would evaluate the patient and recommend diagnostics and treatments based on my assessment. 3. If it was felt that the patient should be evaluated in clinic or an emergency room setting, then they would be directed there. 4. Our sessions are not being recorded and that personal health information is protected. 5. Our team would provide follow up care in person if/when the patient needs it. Patient does agree to proceed with telemedicine consultation. Patient's location: home address in [facility]    Physician  location other address in [place]     Other people involved in call: Mother    This visit was completed virtually using Doxy. me    2021    TELEHEALTH EVALUATION -- Audio/Visual (During [id number] public health emergency)    Chief Complaint   Patient presents with    Forms           HPI:    Normivana Torresedita (:  1985) has requested an audio/video evaluation for the following concern(s):    CC: Patient presents for Neto Etienne  HPI: Here with mom. Does have guardianship forms today. No acute symptoms. Doing well overall. Skin around G-tube intermittently irritated, clears \"instantly\" on the compounded formula as below. Mom uses it as needed. Does not respond to cornstarch or nystatin.        mom says was previously cleared from [facility]. He has no acute symptoms. He is   NPO. G-tube feeds only. Follows with [facility] neurology.     No recent seizures No shortness of breath or wheezing. No fever or chills.      Declines Flu shot counseled on recommendations as well as other vaccines. Had Tdap . Had Pneumovax  and we did discuss booster as well as Prevnar 13  the Pneumovax and Prevnar 13 x 1 year.  had chicken pox. After discussion, after pandemic mom is considering a Prevnar 13x1 with a Pneumovax a year later. Recommendations for Covid vaccine also reviewed     Does not see  Dr. Mariaelena James, gastrointestinal at [facility] any longer. Saw Cardiology at [facility] , said no Pulmonary stenosis despite being told that his whole life, no cardiac issues, fu prn. ?    Has 6 cans of nutrition per day through the G-tube        GERD no acute symptoms. Does not seem to have any symptomatology consistent with headaches or acute neurologic deficit, does not portray symptoms of shortness of breath, chest pain, mild chronic cough, no abdominal discomfort, vomiting, no change in bowels, no melena or hematochezia. .  Had some change in urinary habits, difficulty going but it was negatively assessed by Dr. Eliz Martinez with a negative ultrasound and doing well at this time. No gross hematuria.   No rash or skin lesions.     Past Medical History: Again, extensive including cerebral palsy, severe mental retardation, nonverbal, history of Hirschsprung's Disease, colostomy, feeding tube, seizure disorder, history of valvular disease,  mild pulmonic stenosis (Neg 2017 but difficult exam), chronic allergic rhinitis, thrombocytopenia,  hypercalcemia, lympho cytosis, macrocytosis.     [facility]: Dad  lymphoma/pneumonia 66     Social: living with dorys/mom and forms filled out today      Current Outpatient Medications:     esomeprazole Magnesium (NEXIUM) 20 MG PACK, 1 per g tube every day, Disp: 30 packet, Rfl: 12    Incontinence Supply Disposable ([employer] FITTED BRIEFS DAY/NIGHT MD) MISC, USE AS DIRECTED, Disp: 200 each, Rfl: 12    Incontinence Supply Disposable ([employer] DAY & NIGHT UNDERPADS) MISC, 1 Package by Does not apply route daily, Disp: 200 each, Rfl: 15    Ostomy Supplies (STOMAHESIVE PROTECTIVE) POWD, by Other route Apply to affected area of stoma, Disp: , Rfl:     Zinc Oxide (BALMEX EX), Apply topically Apply to christal site, Disp: , Rfl:     Hydrocortisone (PREPARATION H EX), Apply topically Twice a day as needed, Disp: , Rfl:     Oral Electrolytes (PEDIALYTE PO), Take by mouth prn, Disp: , Rfl:     Nutritional Supplements (ENSURE PO), Take by mouth 6 cans a day, Disp: , Rfl:     loratadine 5 MG/5ML solution, Take 5 mg by mouth daily 10 ml GT QD, Disp: , Rfl:     valproate (DEPAKENE) 250 MG/5ML solution, Take by mouth 3 times daily 5.0 ml tid, Disp: , Rfl:     ethosuximide (ZARONTIN) 250 MG/5ML solution, Take 7.5 mLs by mouth 2 times daily, Disp: , Rfl:     Calcium Carb-Cholecalciferol (CALCIUM-VITAMIN D) 500-200 MG-UNIT per tablet, Take 1 tablet by mouth 2 times daily (with meals), Disp: , Rfl:   Allergies   Allergen Reactions    Lactose     Lactose Intolerance (Gi)     Other     Seasonal        Past Medical History:   Diagnosis Date    Cerebral palsy ([facility] [address]     Hirschsprung disease     Hypospadias     Mental retardation     Pulmonic stenosis      Past Surgical History:   Procedure Laterality Date    COLOSTOMY      FOOT SURGERY      GASTRIC FUNDOPLICATION      GASTROSTOMY TUBE PLACEMENT      PATENT DUCTUS ARTERIOUS LIGATION      RECTAL SURGERY      anal pull through x 2 for Hirschsprung's - then colostomy x 2    TESTICLE REMOVAL       No family history on file.   Social History     Tobacco Use    Smoking status: Never Smoker    Smokeless tobacco: Never Used   Substance Use Topics    Alcohol use: No    Drug use: No     Social History     Social History Narrative    PMH:    Problem List: Infantile cerebral palsy, Congenital heart disease, Thrombocytopenic disorder, Seizure,    Severe mental retardation (I.Q. 20-34)         cerebral palsy, profound mental retardation, nonverbal,    history of Hirschsprung's Disease, colostomy, feeding tube, seizure disorder, history of valvular disease,    mild pulmonic stenosis (recent neg but difficult exam), chronic allergic rhinitis, thrombocytopenia,    hypercalcemia, lympho cytosis, macrocytosis. [address]: Dad  lymphoma/pneumonia 66        SH:    . (Marital)reviewed    Personal Habits: Cigarette Use: Nonsmoker. . (Alcohol)                 PHYSICAL EXAMINATION:  [ INSTRUCTIONS:  \"[x]\" Indicates a positive item  \"[]\" Indicates a negative item  -- DELETE ALL ITEMS NOT EXAMINED]  Vital Signs: (As obtained by patient/caregiver or practitioner observation)    There were no vitals filed for this visit. Blood pressure-  Heart rate-    Respiratory rate-    Temperature-  Pulse oximetry-     Constitutional: [x] Appears well-developed and well-nourished [x] No apparent distress      [] Abnormal-appears baseline health. Nonverbal mental status  [x] Alert and awake  [] Oriented to person/place/time []Able to follow commands      Eyes:  EOM    [x]  Normal  [] Abnormal-  Sclera  [x]  Normal  [] Abnormal -         Discharge [x]  None visible  [] Abnormal -    HENT:   [x] Normocephalic, atraumatic.   [] Abnormal   [x] Mouth/Throat: Mucous membranes are moist.     External Ears [x] Normal  [] Abnormal-     Neck: [x] No visualized mass     Pulmonary/Chest: [x] Respiratory effort normal.  [x] No visualized signs of difficulty breathing or respiratory distress        [] Abnormal-      Musculoskeletal:   [] Normal gait with no signs of ataxia         [x] Normal range of motion of neck        [] Abnormal-       Neurological:        [] No Facial Asymmetry (Cranial nerve 7 motor function) (limited exam to video visit)          [] No gaze palsy        [] Abnormal-no acute changes        Skin:        [x] No significant exanthematous lesions or discoloration noted on facial skin         [] Abnormal-            Psychiatric:       [x] Normal/stable affect [] No Hallucinations        [] Abnormal-     Other pertinent observable physical exam findings-     ASSESSMENT/PLAN:   Diagnosis Orders   1. Other cerebral palsy ([address]     2. Gastroesophageal reflux disease without esophagitis  esomeprazole Magnesium (NEXIUM) 20 MG PACK   3. Hirschsprung's disease     4. Seizure disorder ([address]     5. Pulmonary infundibular stenosis     6. Thrombocytopenia, unspecified ([address]     7. Other dysphagia     8. Severe intellectual disabilities     9. Hypercalcemia     10. Urinary retention     11. Macrocytosis     12. Hypomagnesemia         Urinary retention  Seems back to baseline. Follows with Dr. Cecile oDe. Had renal ultrasound    Macrocytosis  Check V05 folic acid with next blood work. Hypomagnesemia  On supplement. Monitor    Other cerebral palsy ([address]  Counseled. Risk of complications reviewed. Care appears to be excellent. Forms completed.     Hirschsprung's disease  He should continue per GI     Pulmonary infundibular stenosis  Pulmonary stenosis. Continue per cardiology. Last echo apparently did not show it,  but mom not sure that they got a clear picture.     Thrombocytopenia, unspecified ([facility] [address]  Has been stable. Recheck.     Seizure disorder ([facility] [address]  Continue per neurology. Mom defers levels to them. Tolerating medications. ADRs interactions reviewed. Seizure precautions.     Other dysphagia  NPO. Has G-tube     Health maintenance examination  Counseled as above, 1/21. Counseled on vaccines.     Rash  Chronic intermittent irritation around the G-tube but does very well with compound cream through: Pharmacy including Maalox/Aquaphor/zinc oxide/nystatin/0.5% hydrocortisone, not for long-term use, may use 1 to 2 weeks on/1 week off as needed      Counseled extensively and differential diagnoses of above were reviewed, including serious etiologies. Side effects and interactions of medications were reviewed. Plan as above:  Also completed forms.   They are trying to defer going anywhere now because of insurance company to ensure coverage and to fully understand benefits and cost prior to any testing to try to avoid unexpected charges. This note was created with the assistance of voice recognition software. Inadvertent errors may be present. Signs and symptoms to watch for were discussed. Serious signs and symptoms reviewed. ER if any    --Lucy Hsu MD on 1/14/2021 at 5:28 PM    An electronic signature was used to authenticate this note.

## 2021-04-19 ENCOUNTER — TELEPHONE (OUTPATIENT)
Dept: PRIMARY CARE CLINIC | Age: 36
End: 2021-04-19

## 2021-04-19 NOTE — TELEPHONE ENCOUNTER
Double check with mom, I believe this needed preauthorized in the past.  I do not believe anything else can go into the G-tube.   May need preauthorized

## 2021-04-23 ENCOUNTER — TELEPHONE (OUTPATIENT)
Dept: PRIMARY CARE CLINIC | Age: 36
End: 2021-04-23

## 2021-04-23 NOTE — TELEPHONE ENCOUNTER
Patient's mom calling in stating patient received his second covid vaccine on Wednesday and she noticed a slightly raised rash all over his entire body this morning. She states patient is itching his skin. She would like to know what she should do.

## 2021-04-26 RX ORDER — GLYCERIN/MIN OIL/POLYCARBOPHIL
1 GEL WITH APPLICATOR (GRAM) VAGINAL DAILY
Qty: 200 EACH | Refills: 12 | Status: SHIPPED | OUTPATIENT
Start: 2021-04-26

## 2021-04-28 ENCOUNTER — TELEPHONE (OUTPATIENT)
Dept: PRIMARY CARE CLINIC | Age: 36
End: 2021-04-28

## 2021-04-28 NOTE — TELEPHONE ENCOUNTER
Call over the weekend. Hives from covid shot. No other symptoms. Advised on benadryl. Discussed steroids, which were declined. To ER with any new or worsening symptoms.

## 2021-05-06 ENCOUNTER — OFFICE VISIT (OUTPATIENT)
Dept: PRIMARY CARE CLINIC | Age: 36
End: 2021-05-06
Payer: MEDICARE

## 2021-05-06 VITALS
DIASTOLIC BLOOD PRESSURE: 62 MMHG | TEMPERATURE: 96.7 F | WEIGHT: 88 LBS | HEIGHT: 60 IN | BODY MASS INDEX: 17.28 KG/M2 | SYSTOLIC BLOOD PRESSURE: 118 MMHG

## 2021-05-06 DIAGNOSIS — Q24.3 PULMONARY INFUNDIBULAR STENOSIS: ICD-10-CM

## 2021-05-06 DIAGNOSIS — K03.6 TARTAR DEPOSITS ON TEETH: ICD-10-CM

## 2021-05-06 DIAGNOSIS — R33.9 URINARY RETENTION: ICD-10-CM

## 2021-05-06 DIAGNOSIS — D75.89 MACROCYTOSIS: ICD-10-CM

## 2021-05-06 DIAGNOSIS — F72 SEVERE INTELLECTUAL DISABILITIES: ICD-10-CM

## 2021-05-06 DIAGNOSIS — G40.909 SEIZURE DISORDER (HCC): ICD-10-CM

## 2021-05-06 DIAGNOSIS — E83.42 HYPOMAGNESEMIA: ICD-10-CM

## 2021-05-06 DIAGNOSIS — D69.6 THROMBOCYTOPENIA, UNSPECIFIED (HCC): ICD-10-CM

## 2021-05-06 DIAGNOSIS — G80.8 OTHER CEREBRAL PALSY (HCC): Primary | ICD-10-CM

## 2021-05-06 DIAGNOSIS — K21.9 GASTROESOPHAGEAL REFLUX DISEASE WITHOUT ESOPHAGITIS: ICD-10-CM

## 2021-05-06 DIAGNOSIS — E55.9 VITAMIN D DEFICIENCY: ICD-10-CM

## 2021-05-06 DIAGNOSIS — R13.19 OTHER DYSPHAGIA: ICD-10-CM

## 2021-05-06 DIAGNOSIS — Q43.1 HIRSCHSPRUNG'S DISEASE: ICD-10-CM

## 2021-05-06 DIAGNOSIS — J86.9 ABSCESS OF CHEST (HCC): ICD-10-CM

## 2021-05-06 PROCEDURE — G8419 CALC BMI OUT NRM PARAM NOF/U: HCPCS | Performed by: FAMILY MEDICINE

## 2021-05-06 PROCEDURE — 99215 OFFICE O/P EST HI 40 MIN: CPT | Performed by: FAMILY MEDICINE

## 2021-05-06 PROCEDURE — 1036F TOBACCO NON-USER: CPT | Performed by: FAMILY MEDICINE

## 2021-05-06 PROCEDURE — G8427 DOCREV CUR MEDS BY ELIG CLIN: HCPCS | Performed by: FAMILY MEDICINE

## 2021-05-06 NOTE — PROGRESS NOTES
19  Joel Tang : 1985 Sex: male  Age: 37 y.o. Chief Complaint   Patient presents with    Pre-op Exam              HPI:       CC: Patient presents today with mom for preop dental, chronically gets dental cleanings under anesthesia with tartar buildup, chronic tartar buildup, typically every 3 years but this time is been a year and a half. Is never had any issues with it. R/B of procedure and anesthesia reviewed the mom understands. There is been no acute symptoms or concerns. Except he does have a small cyst on his chest that seem to be getting better. Looks like an ingrown hair. No fever or apparent chills. Previous rash resolved. Follows with [facility], [facility]. HPI:     Doing well overall. Skin around G-tube intermittently irritated, clears \"instantly\" on the compounded formula as below.  Mom uses it as needed.    Does not respond to cornstarch or nystatin.        mom says was previously cleared from [facility]. He has no acute symptoms. He is   NPO. G-tube feeds only. Follows with [facility] neurology.    No recent seizures No shortness of breath or  wheezing. No fever or chills.      He had Pfizer Covid vaccine x2. had Tdap .  Had Pneumovax  and we did discuss booster as well as Prevnar 13  the Pneumovax and Prevnar 13 x 1 year.  had chicken pox. A month after his second Covid vaccine they are going to go to the pharmacy to consider Prevnar 13 followed by Pneumovax 1 year later.     Does not see  Dr. Claudio Nagel, gastrointestinal at [facility] any longer. Saw Cardiology at [id number], said no Pulmonary stenosis despite being told that his whole life, no cardiac issues, fu prn.  ?    Has 6 cans of nutrition per day through the G-tube        GERD no acute symptoms.  Does not seem to have any symptomatology consistent with headaches or acute neurologic deficit, does not portray symptoms of shortness of breath, chest pain, mild chronic cough, no abdominal discomfort, vomiting, Carb-Cholecalciferol (CALCIUM-VITAMIN D) 500-200 MG-UNIT per tablet, Take 1 tablet by mouth 2 times daily (with meals), Disp: , Rfl:   Allergies   Allergen Reactions    Lactose     Lactose Intolerance (Gi)     Other     Seasonal        Past Medical History:   Diagnosis Date    Cerebral palsy (HCC)     Hirschsprung disease     Hypospadias     Mental retardation     Pulmonic stenosis      Past Surgical History:   Procedure Laterality Date    COLOSTOMY      FOOT SURGERY      GASTRIC FUNDOPLICATION      GASTROSTOMY TUBE PLACEMENT      PATENT DUCTUS ARTERIOUS LIGATION      RECTAL SURGERY      anal pull through x 2 for Hirschsprung's - then colostomy x 2    TESTICLE REMOVAL       No family history on file. Social History     Tobacco Use    Smoking status: Never Smoker    Smokeless tobacco: Never Used   Substance Use Topics    Alcohol use: No    Drug use: No      Social History     Social History Narrative    PMH:    Problem List: Infantile cerebral palsy, Congenital heart disease, Thrombocytopenic disorder, Seizure,    Severe mental retardation (I.Q. 20-34)         cerebral palsy, profound mental retardation, nonverbal,    history of Hirschsprung's Disease, colostomy, feeding tube, seizure disorder, history of valvular disease,    mild pulmonic stenosis (recent neg but difficult exam), chronic allergic rhinitis, thrombocytopenia,    hypercalcemia, lympho cytosis, macrocytosis. [address]: Dad  lymphoma/pneumonia 66        SH:    . (Marital)reviewed    Personal Habits: Cigarette Use: Nonsmoker. . (Alcohol)        Vitals:    21 1035   BP: 118/62   Temp: 96.7 °F (35.9 °C)   Weight: 88 lb (39.9 kg)   Height: 5' (1.524 m)      Wt Readings from Last 3 Encounters:   21 88 lb (39.9 kg)   19 88 lb (39.9 kg)   03/10/17 88 lb (39.9 kg)            Exam:  Const: Appears comfortable. No signs of acute distress present. Head/Face: Atraumatic on inspection. Eyes: No discharge from the eyes.  Sclerae clear.  ENMT: Ears clear nose clear rhinorrhea oropharynx MMM  Neck: Supple. Palpation reveals no adenopathy. No masses appreciated. No JVD. Resp: Respirations are unlabored. Clear to auscultation. No rales, rhonchi or wheezes  appreciated over the lungs bilaterally. CV: Rate is regular. Rhythm is regular. Extremities: No clubbing or cyanosis. No edema of the lower limbs bilaterally. No calf  inflammation or tenderness. Abdomen: Bowel sounds are normoactive. Abdomen is soft, nontender, and nondistended. No  abdominal masses. No palpable hepatosplenomegaly. G-tube without surrounding irritation. Skin: Dry and warm with no rash. Small papule without significant induration or surrounding erythema chest  Neuro: without acute change. Braces both legs. Nonverbal.      Assessment and Plan:   Diagnosis Orders   1. Other cerebral palsy (HCC)  Lipid Panel    TSH without Reflex    Comprehensive Metabolic Panel   2. Tartar deposits on teeth  TSH without Reflex    Comprehensive Metabolic Panel   3. Gastroesophageal reflux disease without esophagitis  Lipid Panel    TSH without Reflex    Comprehensive Metabolic Panel   4. Hirschsprung's disease  TSH without Reflex    Comprehensive Metabolic Panel   5. Seizure disorder (HCC)  VALPROIC ACID LEVEL, TOTAL    TSH without Reflex    Comprehensive Metabolic Panel   6. Pulmonary infundibular stenosis  Lipid Panel    TSH without Reflex    Comprehensive Metabolic Panel   7. Other dysphagia  Lipid Panel    TSH without Reflex    Comprehensive Metabolic Panel   8. Macrocytosis  TSH without Reflex    Comprehensive Metabolic Panel    Vitamin B12 & Folate   9. Thrombocytopenia, unspecified (HCC)  TSH without Reflex    Comprehensive Metabolic Panel    CBC Auto Differential   10. Hypomagnesemia  TSH without Reflex    Comprehensive Metabolic Panel    Magnesium   11. Urinary retention  TSH without Reflex    Comprehensive Metabolic Panel    Urinalysis   12.  Severe intellectual disabilities TSH without Reflex    Comprehensive Metabolic Panel   13. Vitamin D deficiency  Vitamin D 25 Hydroxy   14. Abscess of chest ([address]            Urinary retention  Seems back to baseline. Follows with Dr. Murphy Ortega. Had renal ultrasound    Macrocytosis  Check P31 folic acid with next blood work. Hypomagnesemia  On supplement. Monitor    Other cerebral palsy ([address]  Counseled. Risk of complications reviewed. Care appears to be excellent. Forms completed 1/21.     Hirschsprung's disease  He should continue per GI     Pulmonary infundibular stenosis  Pulmonary stenosis. Continue per cardiology. Last echo apparently did not show it,  but mom not sure that they got a clear picture.     Thrombocytopenia, unspecified ([address]  Has been stable. Recheck.     Seizure disorder ([facility] [address]  Continue per neurology. Mom defers levels to them. Tolerating medications. ADRs interactions reviewed. Seizure precautions. Check valproic acid level     Other dysphagia  NPO. Has G-tube     Health maintenance examination  Counseled  1/21. Counseled on vaccines.     Rash  Chronic intermittent irritation around the G-tube but does very well with compound cream through: Pharmacy including Maalox/Aquaphor/zinc oxide/nystatin/0.5% hydrocortisone, not for long-term use, may use 1 to 2 weeks on/1 week off as needed    Tartar buildup  Gets cleanings under anesthesiaR/B reviewed but medically stable at this time. Forms completed  Abscess chest  Counseled, differential reviewed. Local care reviewed. Start Bactroban ointment to the area 3 times daily for 10 days nares and nails twice daily for 5 days and may consider this every 6 months. Notify if continues or worsens  Vitamin D deficiency  Check levels    No flowsheet data found. Plan as above. Counseled extensively and differential diagnoses relevant to above were reviewed, including serious etiologies. Side effects and interactions of medications were reviewed.       Counseled, forms completed,

## 2021-06-22 ENCOUNTER — HOSPITAL ENCOUNTER (OUTPATIENT)
Age: 36
Discharge: HOME | End: 2021-06-22
Payer: MEDICARE

## 2021-06-22 DIAGNOSIS — E87.5 HYPERKALEMIA: Primary | ICD-10-CM

## 2021-06-22 LAB
ALBUMIN SERPL-MCNC: 4.8 G/DL (ref 3.5–5.2)
ALP BLD-CCNC: 99 U/L (ref 40–129)
ALT SERPL-CCNC: 14 U/L (ref 0–40)
ANION GAP SERPL CALCULATED.3IONS-SCNC: 9 MMOL/L (ref 7–16)
AST SERPL-CCNC: 22 U/L (ref 0–39)
BASOPHILS ABSOLUTE: 0.04 E9/L (ref 0–0.2)
BASOPHILS RELATIVE PERCENT: 0.5 % (ref 0–2)
BILIRUB SERPL-MCNC: 0.3 MG/DL (ref 0–1.2)
BUN BLDV-MCNC: 11 MG/DL (ref 6–20)
CALCIUM SERPL-MCNC: 10.8 MG/DL (ref 8.6–10.2)
CHLORIDE BLD-SCNC: 102 MMOL/L (ref 98–107)
CHOLESTEROL, TOTAL: 123 MG/DL (ref 0–199)
CO2: 31 MMOL/L (ref 22–29)
CREAT SERPL-MCNC: 0.6 MG/DL (ref 0.7–1.2)
EOSINOPHILS ABSOLUTE: 0.05 E9/L (ref 0.05–0.5)
EOSINOPHILS RELATIVE PERCENT: 0.7 % (ref 0–6)
FOLATE: >20 NG/ML (ref 4.8–24.2)
GFR AFRICAN AMERICAN: >60
GFR NON-AFRICAN AMERICAN: >60 ML/MIN/1.73
GLUCOSE BLD-MCNC: 98 MG/DL (ref 74–99)
HCT VFR BLD CALC: 47.5 % (ref 37–54)
HDLC SERPL-MCNC: 49 MG/DL
HEMOGLOBIN: 16.3 G/DL (ref 12.5–16.5)
IMMATURE GRANULOCYTES #: 0.02 E9/L
IMMATURE GRANULOCYTES %: 0.3 % (ref 0–5)
LDL CHOLESTEROL CALCULATED: 62 MG/DL (ref 0–99)
LYMPHOCYTES ABSOLUTE: 2.64 E9/L (ref 1.5–4)
LYMPHOCYTES RELATIVE PERCENT: 35.3 % (ref 20–42)
MAGNESIUM: 2 MG/DL (ref 1.6–2.6)
MCH RBC QN AUTO: 33.3 PG (ref 26–35)
MCHC RBC AUTO-ENTMCNC: 34.3 % (ref 32–34.5)
MCV RBC AUTO: 97.1 FL (ref 80–99.9)
MONOCYTES ABSOLUTE: 1.03 E9/L (ref 0.1–0.95)
MONOCYTES RELATIVE PERCENT: 13.8 % (ref 2–12)
NEUTROPHILS ABSOLUTE: 3.7 E9/L (ref 1.8–7.3)
NEUTROPHILS RELATIVE PERCENT: 49.4 % (ref 43–80)
PDW BLD-RTO: 12.4 FL (ref 11.5–15)
PLATELET # BLD: 128 E9/L (ref 130–450)
PMV BLD AUTO: 13.5 FL (ref 7–12)
POTASSIUM SERPL-SCNC: 5.4 MMOL/L (ref 3.5–5)
RBC # BLD: 4.89 E12/L (ref 3.8–5.8)
SODIUM BLD-SCNC: 142 MMOL/L (ref 132–146)
TOTAL PROTEIN: 8.2 G/DL (ref 6.4–8.3)
TRIGL SERPL-MCNC: 62 MG/DL (ref 0–149)
TSH SERPL DL<=0.05 MIU/L-ACNC: 1.34 UIU/ML (ref 0.27–4.2)
VALPROIC ACID LEVEL: 96 MCG/ML (ref 50–100)
VITAMIN B-12: >2000 PG/ML (ref 211–946)
VITAMIN D 25-HYDROXY: 97 NG/ML (ref 30–100)
VLDLC SERPL CALC-MCNC: 12 MG/DL
WBC # BLD: 7.5 E9/L (ref 4.5–11.5)

## 2021-06-22 PROCEDURE — 82306 VITAMIN D 25 HYDROXY: CPT

## 2021-06-22 PROCEDURE — 80053 COMPREHEN METABOLIC PANEL: CPT

## 2021-06-22 PROCEDURE — 84443 ASSAY THYROID STIM HORMONE: CPT

## 2021-06-22 PROCEDURE — 85025 COMPLETE CBC W/AUTO DIFF WBC: CPT

## 2021-06-22 PROCEDURE — 82746 ASSAY OF FOLIC ACID SERUM: CPT

## 2021-06-22 PROCEDURE — 36415 COLL VENOUS BLD VENIPUNCTURE: CPT

## 2021-06-22 PROCEDURE — 82607 VITAMIN B-12: CPT

## 2021-06-22 PROCEDURE — 83735 ASSAY OF MAGNESIUM: CPT

## 2021-06-22 PROCEDURE — 80164 ASSAY DIPROPYLACETIC ACD TOT: CPT

## 2021-06-22 PROCEDURE — 80061 LIPID PANEL: CPT

## 2021-06-22 NOTE — RESULT ENCOUNTER NOTE
Potassium is elevated at 5.4. If truly elevated can become concerning however we have had multiple false positives at this lab. Make sure not taking any potassium supplement per se. Unfortunately would recommend a repeat potassium to be safe. Other lab abnormalities stable, many labs pending.   I would encourage them to do at least a video follow-up so we can discuss and review more detail

## 2021-06-25 NOTE — RESULT ENCOUNTER NOTE
Usually it is in the form of IUs if you can double check. Stop vitamin D supplementation. Schedule at least a virtual visit as above.   Thanks

## 2021-06-30 DIAGNOSIS — T45.2X1A POISONING BY VITAMIN D, ACCIDENTAL OR UNINTENTIONAL, INITIAL ENCOUNTER: Primary | ICD-10-CM

## 2021-08-12 ENCOUNTER — HOSPITAL ENCOUNTER (OUTPATIENT)
Age: 36
Discharge: HOME | End: 2021-08-12
Payer: MEDICARE

## 2021-08-12 DIAGNOSIS — T45.2X1A POISONING BY VITAMIN D, ACCIDENTAL OR UNINTENTIONAL, INITIAL ENCOUNTER: ICD-10-CM

## 2021-08-12 DIAGNOSIS — E87.5 HYPERKALEMIA: ICD-10-CM

## 2021-08-12 LAB
POTASSIUM SERPL-SCNC: 4.7 MMOL/L (ref 3.5–5)
VITAMIN D 25-HYDROXY: 90 NG/ML (ref 30–100)

## 2021-08-12 PROCEDURE — 36415 COLL VENOUS BLD VENIPUNCTURE: CPT

## 2021-08-12 PROCEDURE — 82306 VITAMIN D 25 HYDROXY: CPT

## 2021-08-12 PROCEDURE — 84132 ASSAY OF SERUM POTASSIUM: CPT

## 2021-08-13 ENCOUNTER — TELEPHONE (OUTPATIENT)
Dept: PRIMARY CARE CLINIC | Age: 36
End: 2021-08-13

## 2021-08-13 DIAGNOSIS — T45.2X1A POISONING BY VITAMIN D, ACCIDENTAL OR UNINTENTIONAL, INITIAL ENCOUNTER: Primary | ICD-10-CM

## 2021-08-13 NOTE — RESULT ENCOUNTER NOTE
Just make sure not taking any other vitamin D supplementation. It is technically still normal and did trend down but certainly on the high end. We will monitor. Recheck 3 months sooner as needed.

## 2021-08-13 NOTE — RESULT ENCOUNTER NOTE
Vitamin D did come down slightly from 97-90 but still near toxic levels.   Check if taking any vitamin D supplementation and make sure he is not

## 2021-08-13 NOTE — TELEPHONE ENCOUNTER
Hector Odor   8/13/2021  4:58 PM EDT       Mom notified. Is not taking vitamin d supplements but she did  notice his ensure has vitamin d in it 5 mcg/5% he has 6 bottles daily.      Rayna Pang MD   8/13/2021  1:53 PM EDT       Vitamin D did come down slightly from 97-90 but still near toxic levels.  Check if taking any vitamin D supplementation and make sure he is not    Rayna Pang MD   8/12/2021  4:06 PM EDT       Potassium normalized

## 2021-09-01 NOTE — TELEPHONE ENCOUNTER
Please see message on 8/12/21    \"Just make sure not taking any other vitamin D supplementation. It is technically still normal and did trend down but certainly on the high end. We will monitor.   Recheck 3 months sooner as needed\" may continue his ensure

## 2021-09-01 NOTE — TELEPHONE ENCOUNTER
Mom calling back she did not hear back wanting to know what she is to be doing based on lab results. He is not on vitamin d supplement  Taking ensure 6 bottles daily which has 5mcg/5% vitamin d.

## 2021-11-29 ENCOUNTER — HOSPITAL ENCOUNTER (OUTPATIENT)
Age: 36
Discharge: HOME | End: 2021-11-29
Payer: MEDICARE

## 2021-11-29 DIAGNOSIS — T45.2X1A POISONING BY VITAMIN D, ACCIDENTAL OR UNINTENTIONAL, INITIAL ENCOUNTER: ICD-10-CM

## 2021-11-29 LAB
ANION GAP SERPL CALCULATED.3IONS-SCNC: 7 MMOL/L (ref 7–16)
BUN BLDV-MCNC: 10 MG/DL (ref 6–20)
CALCIUM SERPL-MCNC: 10.5 MG/DL (ref 8.6–10.2)
CHLORIDE BLD-SCNC: 101 MMOL/L (ref 98–107)
CO2: 31 MMOL/L (ref 22–29)
CREAT SERPL-MCNC: 0.6 MG/DL (ref 0.7–1.2)
GFR AFRICAN AMERICAN: >60
GFR NON-AFRICAN AMERICAN: >60 ML/MIN/1.73
GLUCOSE BLD-MCNC: 82 MG/DL (ref 74–99)
POTASSIUM SERPL-SCNC: 5.1 MMOL/L (ref 3.5–5)
SODIUM BLD-SCNC: 139 MMOL/L (ref 132–146)
VITAMIN D 25-HYDROXY: 80 NG/ML (ref 30–100)

## 2021-11-29 PROCEDURE — 80048 BASIC METABOLIC PNL TOTAL CA: CPT

## 2021-11-29 PROCEDURE — 36415 COLL VENOUS BLD VENIPUNCTURE: CPT

## 2021-11-29 PROCEDURE — 82306 VITAMIN D 25 HYDROXY: CPT

## 2021-12-01 ENCOUNTER — OFFICE VISIT (OUTPATIENT)
Dept: PRIMARY CARE CLINIC | Age: 36
End: 2021-12-01
Payer: MEDICARE

## 2021-12-01 VITALS
DIASTOLIC BLOOD PRESSURE: 80 MMHG | SYSTOLIC BLOOD PRESSURE: 122 MMHG | WEIGHT: 98 LBS | BODY MASS INDEX: 19.14 KG/M2 | TEMPERATURE: 97.5 F

## 2021-12-01 DIAGNOSIS — T45.2X1A POISONING BY VITAMIN D, ACCIDENTAL OR UNINTENTIONAL, INITIAL ENCOUNTER: ICD-10-CM

## 2021-12-01 DIAGNOSIS — K21.9 GASTROESOPHAGEAL REFLUX DISEASE WITHOUT ESOPHAGITIS: ICD-10-CM

## 2021-12-01 DIAGNOSIS — J06.9 ACUTE UPPER RESPIRATORY INFECTION, UNSPECIFIED: ICD-10-CM

## 2021-12-01 DIAGNOSIS — E87.5 HYPERKALEMIA: ICD-10-CM

## 2021-12-01 DIAGNOSIS — G40.909 SEIZURE DISORDER (HCC): ICD-10-CM

## 2021-12-01 DIAGNOSIS — E83.52 HYPERCALCEMIA: ICD-10-CM

## 2021-12-01 DIAGNOSIS — Q43.1 HIRSCHSPRUNG'S DISEASE: ICD-10-CM

## 2021-12-01 DIAGNOSIS — G80.8 OTHER CEREBRAL PALSY (HCC): Primary | ICD-10-CM

## 2021-12-01 DIAGNOSIS — D75.89 MACROCYTOSIS: ICD-10-CM

## 2021-12-01 DIAGNOSIS — F72 SEVERE INTELLECTUAL DISABILITIES: ICD-10-CM

## 2021-12-01 PROCEDURE — 99215 OFFICE O/P EST HI 40 MIN: CPT | Performed by: FAMILY MEDICINE

## 2021-12-01 PROCEDURE — G8420 CALC BMI NORM PARAMETERS: HCPCS | Performed by: FAMILY MEDICINE

## 2021-12-01 PROCEDURE — G8484 FLU IMMUNIZE NO ADMIN: HCPCS | Performed by: FAMILY MEDICINE

## 2021-12-01 PROCEDURE — G8427 DOCREV CUR MEDS BY ELIG CLIN: HCPCS | Performed by: FAMILY MEDICINE

## 2021-12-01 PROCEDURE — 1036F TOBACCO NON-USER: CPT | Performed by: FAMILY MEDICINE

## 2021-12-01 NOTE — ASSESSMENT & PLAN NOTE
Counseled extensively. Differential reviewed, including serious etiologies. Bacterial and viral precautions reviewed including Covid. Seems to be very mild and improving. Mom would simply like to observe at this time and will notify if symptoms persist or worsen in any way. Signs and symptoms watch were reviewed.

## 2021-12-01 NOTE — ASSESSMENT & PLAN NOTE
trending down after stopping supplement. Monitor. Other means of evaluation reviewed, symptomatically stable And will stay conservative at this time.

## 2021-12-01 NOTE — ASSESSMENT & PLAN NOTE
I do not like the nomenclature, this is [facility] only diagnosis choice for elevated vitamin D. Trending down off supplementation.   Asymptomatic

## 2021-12-01 NOTE — PROGRESS NOTES
19  Zara Levi : 1985 Sex: male  Age: 31 y.o. Chief Complaint   Patient presents with    Annual Exam    Discuss Labs    Congestion     2-3 days      Patient presents today with mom for annual physical and annual forms   Generally doing well, did develop some mild nasal congestion in the last 2 to 3 days, mild chronic cough. Appears well otherwise. Develop severe generalized rash/hives after second Pfizer vaccine. Since normalized. Risk of third vaccine reviewed. Contrasted mRNA with Harish Number, R/B reviewed. mom says was previously cleared from [facility]. He has no  other acute symptoms. He is   NPO. G-tube feeds only. Follows with [facility] neurology.      They monitor valproic acid. No recent seizures No shortness of breath or  wheezing. No fever or chills.      He had Pfizer Covid vaccine x2. had Tdap .  Had Pneumovax  and we did discuss booster as well as Prevnar 13  the Pneumovax and Prevnar 13 x 1 year.  had chicken pox. Counseled on flu vaccine as well    On file not take calcium supplement had more D than she thought, nearly toxic level is trending down    Planning to go to the pharmacy or come back in the near future for nursing visit for flu vaccine and Prevnar 13    No longer sees  Dr. Xavier Pittman, gastrointestinal at [facility] any longer. Saw Cardiology at [facility] 06, said no Pulmonary stenosis despite being told that his whole life, no cardiac issues, fu prn.    Has 6 cans of nutrition per day through the G-tube      Does not seem to have any symptomatology consistent with headaches or acute neurologic deficit, does not portray symptoms of shortness of breath, chest pain, mild chronic cough, no abdominal discomfort, vomiting, no change in bowels, no melena or hematochezia. Latricia Morales some change in urinary habits, difficulty going but it was negatively assessed by Dr. Chayo Mas with a negative ultrasound and doing well at this time.  No gross hematuria.  He has had no issues since. No rash. Past Medical History:  extensive including cerebral palsy, severe mental retardation, nonverbal, history of Hirschsprung's Disease, colostomy, feeding tube, seizure disorder, history of valvular disease,  mild pulmonic stenosis (Neg 2017 but difficult exam), chronic allergic rhinitis, thrombocytopenia,  hypercalcemia, lympho cytosis, macrocytosis.     [facility]: Dad  lymphoma/pneumonia 66     Social: living with mom     Blood work reviewed,  Potassium went from 5.4-4.7 5.1, CO2 31-31, calcium 10.8-10.5, vitamin D 97 to 90-80.  blood work also reviewed.     Most Recent Labs  CBC  Lab Results   Component Value Date    WBC 7.5 2021    WBC 7.7 2020    WBC 12.5 2018    RBC 4.89 2021    RBC 4.54 2020    RBC 4.63 2018    HGB 16.3 2021    HGB 15.2 2020    HGB 15.8 2018    HCT 47.5 2021    HCT 45.6 2020    HCT 46.2 2018    MCV 97.1 2021    .4 2020    MCV 99.8 2018     2021     2020     2018      CMP  Lab Results   Component Value Date     2021     2021     2020    K 5.1 2021    K 4.7 2021    K 5.4 2021     2021     2021     2020    CO2 31 2021    CO2 31 2021    CO2 32 2020    ANIONGAP 7 2021    ANIONGAP 9 2021    ANIONGAP 11 2020    GLUCOSE 82 2021    GLUCOSE 98 2021    GLUCOSE 93 2020    BUN 10 2021    BUN 11 2021    BUN 10 2020    CREATININE 0.6 2021    CREATININE 0.6 2021    CREATININE 0.6 2020    LABGLOM >60 2021    LABGLOM >60 2021    LABGLOM >60 2020    GFRAA >60 2021    GFRAA >60 2021    GFRAA >60 2020    CALCIUM 10.5 2021    CALCIUM 10.8 2021    CALCIUM 10.6 2020    PROT 8.2 2021    PROT 8.0 2020    PROT 8.7 08/26/2018    LABALBU 4.8 06/22/2021    LABALBU 4.8 01/25/2020    LABALBU 4.7 08/26/2018    BILITOT 0.3 06/22/2021    BILITOT 0.3 01/25/2020    BILITOT 0.5 08/26/2018    ALKPHOS 99 06/22/2021    ALKPHOS 100 01/25/2020    ALKPHOS 94 08/26/2018    AST 22 06/22/2021    AST 22 01/25/2020    AST 21 08/26/2018    ALT 14 06/22/2021    ALT 12 01/25/2020    ALT 13 08/26/2018     A1C  No results found for: LABA1C  TSH  Lab Results   Component Value Date    TSH 1.340 06/22/2021    TSH 2.380 01/25/2020    TSH 1.380 08/25/2016     FREET4  No results found for: F2ZCZTK  LIPID  Lab Results   Component Value Date    CHOL 123 06/22/2021    CHOL 119 01/25/2020    HDL 49 06/22/2021    HDL 52 01/25/2020    LDLCALC 62 06/22/2021    LDLCALC 49 01/25/2020    TRIG 62 06/22/2021    TRIG 89 01/25/2020     VITAMIN D  Lab Results   Component Value Date    VITD25 80 11/29/2021    VITD25 90 08/12/2021    VITD25 97 06/22/2021     MAGNESIUM  Lab Results   Component Value Date    MG 2.0 06/22/2021      PHOS  No results found for: PHOS   MELLISSA   No results found for: MELLISSA  RHEUMATOID FACTOR  No results found for: RF  PSA  No results found for: PSA   HEPATITIS C  No results found for: HCVABI  HIV  No results found for: XQA7COC, HIV1QT  UA  Lab Results   Component Value Date    COLORU Yellow 01/09/2019    CLARITYU Clear 01/09/2019    GLUCOSEU Negative 01/09/2019    BILIRUBINUR Negative 01/09/2019    KETUA Negative 01/09/2019    SPECGRAV >=1.030 01/09/2019    BLOODU Negative 01/09/2019    PHUR 5.5 01/09/2019    PROTEINU Negative 01/09/2019    UROBILINOGEN 0.2 01/09/2019    NITRU Negative 01/09/2019    LEUKOCYTESUR Negative 01/09/2019     Urine Micro/Albumin Ratio  No results found for: MALBCR        Current Outpatient Medications:     Incontinence Supply Disposable ([employer] DAY & NIGHT UNDERPADS) MISC, 1 Package by Does not apply route daily, Disp: 200 each, Rfl: 12    esomeprazole Magnesium (NEXIUM) 20 MG PACK, 1 per g tube every day, Disp: 30 packet, Rfl: 12    Incontinence Supply Disposable ([employer] FITTED BRIEFS DAY/NIGHT MD) [facility], USE AS DIRECTED, Disp: 200 each, Rfl: 15    Ostomy Supplies (STOMAHESIVE PROTECTIVE) POWD, by Other route Apply to affected area of stoma, Disp: , Rfl:     Zinc Oxide (BALMEX EX), Apply topically Apply to christal site, Disp: , Rfl:     Hydrocortisone (PREPARATION H EX), Apply topically Twice a day as needed, Disp: , Rfl:     Oral Electrolytes (PEDIALYTE PO), Take by mouth prn, Disp: , Rfl:     Nutritional Supplements (ENSURE PO), Take by mouth 6 cans a day, Disp: , Rfl:     loratadine 5 MG/5ML solution, Take 5 mg by mouth daily 10 ml GT QD, Disp: , Rfl:     valproate (DEPAKENE) 250 MG/5ML solution, Take by mouth 3 times daily 5.0 ml tid, Disp: , Rfl:     ethosuximide (ZARONTIN) 250 MG/5ML solution, Take 7.5 mLs by mouth 2 times daily, Disp: , Rfl:   Allergies   Allergen Reactions    Lactose     Lactose Intolerance (Gi)     Other     Peg-2000 (Covid-19 Mrna Vaccine Component)      Rash generalized after second phizer vaccine    Seasonal        Past Medical History:   Diagnosis Date    Cerebral palsy ([facility] [address]     Hirschsprung disease     Hypospadias     Mental retardation     Pulmonic stenosis      Past Surgical History:   Procedure Laterality Date    COLOSTOMY      FOOT SURGERY      GASTRIC FUNDOPLICATION      GASTROSTOMY TUBE PLACEMENT      PATENT DUCTUS ARTERIOUS LIGATION      RECTAL SURGERY      anal pull through x 2 for Hirschsprung's - then colostomy x 2    TESTICLE REMOVAL       No family history on file.   Social History     Tobacco Use    Smoking status: Never Smoker    Smokeless tobacco: Never Used   Vaping Use    Vaping Use: Never used   Substance Use Topics    Alcohol use: No    Drug use: No      Social History     Social History Narrative    PMH:    Problem List: Infantile cerebral palsy, Congenital heart disease, Thrombocytopenic disorder, Seizure,    Severe mental retardation (I.Q. 20-34) cerebral palsy, profound mental retardation, nonverbal,    history of Hirschsprung's Disease, colostomy, feeding tube, seizure disorder, history of valvular disease,    mild pulmonic stenosis (recent neg but difficult exam), chronic allergic rhinitis, thrombocytopenia,    hypercalcemia, lympho cytosis, macrocytosis. [address]: Dad  lymphoma/pneumonia 66        SH:    . (Marital)reviewed    Personal Habits: Cigarette Use: Nonsmoker. . (Alcohol)        Vitals:    21 1532   BP: 122/80   Temp: 97.5 °F (36.4 °C)   Weight: 98 lb (44.5 kg)      Wt Readings from Last 3 Encounters:   21 98 lb (44.5 kg)   21 88 lb (39.9 kg)   19 88 lb (39.9 kg)            Exam:  Const: Appears comfortable. No signs of acute distress present. Alert  Head/Face: Atraumatic on inspection. Eyes: No discharge from the eyes. Sclerae clear. ENMT: Ears clear nose clear rhinorrhea oropharynx MMM  Neck: Supple. Palpation reveals no adenopathy. No masses appreciated. No JVD. Resp: Respirations are unlabored. Clear to auscultation. No rales, rhonchi or wheezes  appreciated over the lungs bilaterally. CV: Rate is regular. Rhythm is regular. Extremities: No clubbing or cyanosis. No edema of the lower limbs bilaterally. No calf  inflammation or tenderness. Abdomen: Bowel sounds are normoactive. Abdomen is soft, nontender, and nondistended. No  abdominal masses. No palpable hepatosplenomegaly. G-tube without surrounding irritation. Skin: Dry and warm with no rash. Neuro: without acute change. Braces both legs. Nonverbal.      Assessment and Plan:   Diagnosis Orders   1. Other cerebral palsy (HCC)  TSH without Reflex    Comprehensive Metabolic Panel    CBC Auto Differential   2. Hyperkalemia  TSH without Reflex    Comprehensive Metabolic Panel    CBC Auto Differential   3. Hypercalcemia  TSH without Reflex    Comprehensive Metabolic Panel    CBC Auto Differential   4.  Seizure disorder ([facility] [address]  TSH without Reflex Comprehensive Metabolic Panel    CBC Auto Differential   5. Gastroesophageal reflux disease without esophagitis  TSH without Reflex    Comprehensive Metabolic Panel    CBC Auto Differential   6. Hirschsprung's disease  TSH without Reflex    Comprehensive Metabolic Panel    CBC Auto Differential   7. Severe intellectual disabilities  TSH without Reflex    Comprehensive Metabolic Panel    CBC Auto Differential   8. Poisoning by vitamin D, accidental or unintentional, initial encounter  Vitamin D 25 Hydroxy    TSH without Reflex    Comprehensive Metabolic Panel    CBC Auto Differential   9. Acute upper respiratory infection, unspecified     10. Macrocytosis  Magnesium        Severe intellectual disabilities    chronic, getting excellent care    Gastroesophageal reflux disease without esophagitis    chronic, well controlled With use of Nexium through G-tube. R/B reviewed. Defers follow-up with GI unless symptoms. Hypercalcemia    trending down after stopping supplement. Monitor. Other means of evaluation reviewed, symptomatically stable And will stay conservative at this time. Poisoning by vitamin D    I do not like the nomenclature, this is Epics only diagnosis choice for elevated vitamin D. Trending down off supplementation. Asymptomatic    Acute upper respiratory infection, unspecified    Counseled extensively. Differential reviewed, including serious etiologies. Bacterial and viral precautions reviewed including Covid. Seems to be very mild and improving. Mom would simply like to observe at this time and will notify if symptoms persist or worsen in any way. Signs and symptoms watch were reviewed. Hyperkalemia    chronically fluctuated. Hemolysis may be playing a role. Asymptomatic. Monitor. Other cerebral palsy ([address]  Counseled. Risk of complications reviewed. Care appears to be excellent. Forms completed today, 12/1/2021.     Hirschsprung's disease  He should continue per GI     Pulmonary infundibular stenosis  Pulmonary stenosis. Continue per cardiology. Last echo apparently did not show it,  but mom not sure that they got a clear picture.     Thrombocytopenia, unspecified ([address]  Has been stable.       Seizure disorder ([facility] [address]  Continue per neurology. Mom defers levels to them. Tolerating medications. ADRs interactions reviewed. Seizure precautions.      Other dysphagia  Is strict NPO. Has G-tube     Health maintenance examination  Counseled at length as above, 12/1/2021, encourage yearly Counseled on vaccines.     Rash  Chronic intermittent irritation around the G-tube but does very well with compound cream through: Pharmacy including Maalox/Aquaphor/zinc oxide/nystatin/0.5% hydrocortisone, not for long-term use, may use 1 to 2 weeks on/1 week off as needed    Tartar buildup  Gets cleanings under anesthesia-counseled  Vitamin D deficiency  Trending down after supplementation stopped    Urinary retention   back to baseline. Continue per Dr. Nayan Boston. Had renal ultrasound    Macrocytosis  Subsequently normalized  , Vitamin B12/folic acid actually high    Hypomagnesemia  On supplement. Monitor    No flowsheet data found. Plan as above. Counseled extensively and differential diagnoses relevant to above were reviewed, including serious etiologies. Side effects and interactions of medications were reviewed. Counseled extensively. Forms completed. Prog will remain guarded given multiple comorbities but excellent care being provided by  caretakers. Continue per multiple specialists. appears baseline health overall. At this point simply wants blood work and follow-up for 1 year physical sooner if needed. Precautions reviewed. Over 40  minutes spent with the patient in reviewing records, reviewing with patient/family, counseling,  ordering, prescribing, completing h&p, etc., with over 50% of the time spent face to face  counseling.  Signs and symptoms to watch for discussed. Serious signs and symptoms reviewed. ER if any. As long as symptoms steadily improve/resolve, and medical conditions follow the expected course, FU as below, sooner PRN. Return in about 1 year (around 12/1/2022), or if symptoms worsen or fail to improve, for physical.         Signs and symptoms to watch for discussed, serious signs and symptoms reviewed. ER if any. Joselo Barfield MD    Patients are advised to check with insurance company to ensure coverage and to fully understand benefits and cost prior to any testing. This note was created with the assistance of voice recognition software. Document was reviewed however may contain grammatical errors.

## 2021-12-01 NOTE — ASSESSMENT & PLAN NOTE
chronic, well controlled With use of Nexium through G-tube. R/B reviewed. Defers follow-up with GI unless symptoms.

## 2021-12-26 ENCOUNTER — TELEPHONE (OUTPATIENT)
Dept: PRIMARY CARE CLINIC | Age: 36
End: 2021-12-26

## 2021-12-26 DIAGNOSIS — J40 BRONCHITIS: Primary | ICD-10-CM

## 2021-12-26 RX ORDER — AMOXICILLIN AND CLAVULANATE POTASSIUM 400; 57 MG/5ML; MG/5ML
800 POWDER, FOR SUSPENSION ORAL 2 TIMES DAILY
Qty: 150 ML | Refills: 0 | Status: SHIPPED | OUTPATIENT
Start: 2021-12-26 | End: 2022-01-02

## 2022-01-06 DIAGNOSIS — K21.9 GASTROESOPHAGEAL REFLUX DISEASE WITHOUT ESOPHAGITIS: ICD-10-CM

## 2022-01-06 RX ORDER — ESOMEPRAZOLE MAGNESIUM 20 MG/1
FOR SUSPENSION ORAL
Qty: 30 PACKET | Refills: 5 | Status: SHIPPED
Start: 2022-01-06 | End: 2022-07-13 | Stop reason: SDUPTHER

## 2022-01-06 RX ORDER — METRONIDAZOLE 250 MG/1
250 TABLET ORAL 2 TIMES DAILY
Qty: 14 TABLET | Refills: 1 | Status: SHIPPED | OUTPATIENT
Start: 2022-01-06 | End: 2022-01-13

## 2022-01-06 NOTE — TELEPHONE ENCOUNTER
Mom also had requested RF on Nexium in addition to script for Flagyl. She stated as in previous message they have always kept on hand and given to him when any sxs of infection and this is the only med that helps. Please give Flagyl 250mg BID x 7days.  Last script given had more then enough for the 7days so that it was she has been using in past

## 2022-01-06 NOTE — TELEPHONE ENCOUNTER
I see there is a request for Flagyl yet Nexium is pended? Does he need both? In regard to Flagyl, I do not see where I ever wrote, looks like was written in hospital at one point. If he is using this with success at various times in his life and he has mild symptoms I am okay writing it-just let me know exactly what he has been on, what form i.e. pill/liquid, dose Sig and how long he has taken in the past i.e. what that is last prescription say? Standard precautions. See if needed. Let me know.

## 2022-01-06 NOTE — TELEPHONE ENCOUNTER
Mom calling asking for rf on Flagyl. She states they keep on hand for any GI infections which they feel he has now.  He has excessive gas built up in Colostomy bag

## 2022-06-06 RX ORDER — IODINE/SODIUM IODIDE 2 %
TINCTURE TOPICAL
Qty: 200 EACH | Refills: 12 | Status: SHIPPED | OUTPATIENT
Start: 2022-06-06 | End: 2024-03-25

## 2022-06-06 NOTE — TELEPHONE ENCOUNTER
Mom calling in for refill on pt's depends/incontinence briefs     Last Appointment:  12/1/2021    Future appts:  No future appointments.

## 2022-07-13 DIAGNOSIS — K21.9 GASTROESOPHAGEAL REFLUX DISEASE WITHOUT ESOPHAGITIS: ICD-10-CM

## 2022-07-13 RX ORDER — ESOMEPRAZOLE MAGNESIUM 20 MG/1
FOR SUSPENSION ORAL
Qty: 30 PACKET | Refills: 5 | Status: SHIPPED | OUTPATIENT
Start: 2022-07-13

## 2022-07-13 ASSESSMENT — PATIENT HEALTH QUESTIONNAIRE - PHQ9
SUM OF ALL RESPONSES TO PHQ QUESTIONS 1-9: 0
SUM OF ALL RESPONSES TO PHQ QUESTIONS 1-9: 0
SUM OF ALL RESPONSES TO PHQ9 QUESTIONS 1 & 2: 0
1. LITTLE INTEREST OR PLEASURE IN DOING THINGS: 0
SUM OF ALL RESPONSES TO PHQ QUESTIONS 1-9: 0
2. FEELING DOWN, DEPRESSED OR HOPELESS: 0
SUM OF ALL RESPONSES TO PHQ QUESTIONS 1-9: 0

## 2022-11-22 ENCOUNTER — OFFICE VISIT (OUTPATIENT)
Dept: PRIMARY CARE CLINIC | Age: 37
End: 2022-11-22
Payer: MEDICARE

## 2022-11-22 ENCOUNTER — APPOINTMENT (OUTPATIENT)
Dept: CT IMAGING | Age: 37
DRG: 872 | End: 2022-11-22
Payer: MEDICARE

## 2022-11-22 ENCOUNTER — HOSPITAL ENCOUNTER (INPATIENT)
Age: 37
LOS: 1 days | Discharge: HOME | DRG: 872 | End: 2022-11-23
Attending: EMERGENCY MEDICINE | Admitting: INTERNAL MEDICINE
Payer: MEDICARE

## 2022-11-22 ENCOUNTER — APPOINTMENT (OUTPATIENT)
Dept: GENERAL RADIOLOGY | Age: 37
DRG: 872 | End: 2022-11-22
Payer: MEDICARE

## 2022-11-22 VITALS
TEMPERATURE: 98.7 F | OXYGEN SATURATION: 93 % | HEIGHT: 60 IN | HEART RATE: 136 BPM | WEIGHT: 95 LBS | BODY MASS INDEX: 18.65 KG/M2 | RESPIRATION RATE: 20 BRPM

## 2022-11-22 DIAGNOSIS — J40 BRONCHITIS: Primary | ICD-10-CM

## 2022-11-22 DIAGNOSIS — G40.909 SEIZURE DISORDER (HCC): ICD-10-CM

## 2022-11-22 DIAGNOSIS — R00.0 TACHYCARDIA: ICD-10-CM

## 2022-11-22 DIAGNOSIS — B33.8 RESPIRATORY SYNCYTIAL VIRUS (RSV): Primary | ICD-10-CM

## 2022-11-22 DIAGNOSIS — G80.8 OTHER CEREBRAL PALSY (HCC): ICD-10-CM

## 2022-11-22 DIAGNOSIS — D69.6 THROMBOCYTOPENIA, UNSPECIFIED (HCC): ICD-10-CM

## 2022-11-22 DIAGNOSIS — E86.0 DEHYDRATION: ICD-10-CM

## 2022-11-22 PROBLEM — A41.9 SEPSIS (HCC): Status: ACTIVE | Noted: 2022-11-22

## 2022-11-22 LAB
ALBUMIN SERPL-MCNC: 3.9 G/DL (ref 3.5–5.2)
ALP BLD-CCNC: 136 U/L (ref 40–129)
ALT SERPL-CCNC: 43 U/L (ref 0–40)
ANION GAP SERPL CALCULATED.3IONS-SCNC: 14 MMOL/L (ref 7–16)
AST SERPL-CCNC: 61 U/L (ref 0–39)
BASOPHILS ABSOLUTE: 0.03 E9/L (ref 0–0.2)
BASOPHILS RELATIVE PERCENT: 0.2 % (ref 0–2)
BILIRUB SERPL-MCNC: 0.4 MG/DL (ref 0–1.2)
BUN BLDV-MCNC: 9 MG/DL (ref 6–20)
CALCIUM SERPL-MCNC: 10.7 MG/DL (ref 8.6–10.2)
CHLORIDE BLD-SCNC: 100 MMOL/L (ref 98–107)
CO2: 27 MMOL/L (ref 22–29)
CREAT SERPL-MCNC: 0.7 MG/DL (ref 0.7–1.2)
EKG ATRIAL RATE: 123 BPM
EKG P AXIS: 56 DEGREES
EKG P-R INTERVAL: 122 MS
EKG Q-T INTERVAL: 318 MS
EKG QRS DURATION: 88 MS
EKG QTC CALCULATION (BAZETT): 455 MS
EKG R AXIS: 73 DEGREES
EKG T AXIS: 20 DEGREES
EKG VENTRICULAR RATE: 123 BPM
EOSINOPHILS ABSOLUTE: 0.02 E9/L (ref 0.05–0.5)
EOSINOPHILS RELATIVE PERCENT: 0.2 % (ref 0–6)
GFR SERPL CREATININE-BSD FRML MDRD: >60 ML/MIN/1.73
GLUCOSE BLD-MCNC: 83 MG/DL (ref 74–99)
HCT VFR BLD CALC: 45.9 % (ref 37–54)
HEMOGLOBIN: 15.7 G/DL (ref 12.5–16.5)
IMMATURE GRANULOCYTES #: 0.05 E9/L
IMMATURE GRANULOCYTES %: 0.4 % (ref 0–5)
INFLUENZA A BY PCR: NOT DETECTED
INFLUENZA B BY PCR: NOT DETECTED
LACTIC ACID, SEPSIS: 1.4 MMOL/L (ref 0.5–1.9)
LACTIC ACID, SEPSIS: 2.6 MMOL/L (ref 0.5–1.9)
LYMPHOCYTES ABSOLUTE: 1.04 E9/L (ref 1.5–4)
LYMPHOCYTES RELATIVE PERCENT: 8.1 % (ref 20–42)
MCH RBC QN AUTO: 33.5 PG (ref 26–35)
MCHC RBC AUTO-ENTMCNC: 34.2 % (ref 32–34.5)
MCV RBC AUTO: 98.1 FL (ref 80–99.9)
MONOCYTES ABSOLUTE: 1.9 E9/L (ref 0.1–0.95)
MONOCYTES RELATIVE PERCENT: 14.9 % (ref 2–12)
NEUTROPHILS ABSOLUTE: 9.75 E9/L (ref 1.8–7.3)
NEUTROPHILS RELATIVE PERCENT: 76.2 % (ref 43–80)
PDW BLD-RTO: 12.5 FL (ref 11.5–15)
PLATELET # BLD: 108 E9/L (ref 130–450)
PMV BLD AUTO: 14.1 FL (ref 7–12)
POTASSIUM REFLEX MAGNESIUM: 4.4 MMOL/L (ref 3.5–5)
PRO-BNP: 432 PG/ML (ref 0–125)
PROCALCITONIN: 0.2 NG/ML (ref 0–0.08)
RBC # BLD: 4.68 E12/L (ref 3.8–5.8)
RBC # BLD: NORMAL 10*6/UL
RSV BY PCR: POSITIVE
SARS-COV-2, NAAT: NOT DETECTED
SODIUM BLD-SCNC: 141 MMOL/L (ref 132–146)
TOTAL PROTEIN: 7.8 G/DL (ref 6.4–8.3)
TROPONIN, HIGH SENSITIVITY: 9 NG/L (ref 0–11)
VACUOLATED NEUTROPHILS: ABNORMAL
WBC # BLD: 12.8 E9/L (ref 4.5–11.5)

## 2022-11-22 PROCEDURE — 99214 OFFICE O/P EST MOD 30 MIN: CPT | Performed by: FAMILY MEDICINE

## 2022-11-22 PROCEDURE — G8420 CALC BMI NORM PARAMETERS: HCPCS | Performed by: FAMILY MEDICINE

## 2022-11-22 PROCEDURE — 93010 ELECTROCARDIOGRAM REPORT: CPT | Performed by: INTERNAL MEDICINE

## 2022-11-22 PROCEDURE — G8484 FLU IMMUNIZE NO ADMIN: HCPCS | Performed by: FAMILY MEDICINE

## 2022-11-22 PROCEDURE — 1036F TOBACCO NON-USER: CPT | Performed by: FAMILY MEDICINE

## 2022-11-22 PROCEDURE — 99223 1ST HOSP IP/OBS HIGH 75: CPT | Performed by: INTERNAL MEDICINE

## 2022-11-22 PROCEDURE — 87040 BLOOD CULTURE FOR BACTERIA: CPT

## 2022-11-22 PROCEDURE — 6370000000 HC RX 637 (ALT 250 FOR IP): Performed by: STUDENT IN AN ORGANIZED HEALTH CARE EDUCATION/TRAINING PROGRAM

## 2022-11-22 PROCEDURE — 85025 COMPLETE CBC W/AUTO DIFF WBC: CPT

## 2022-11-22 PROCEDURE — 99285 EMERGENCY DEPT VISIT HI MDM: CPT

## 2022-11-22 PROCEDURE — 93005 ELECTROCARDIOGRAM TRACING: CPT | Performed by: STUDENT IN AN ORGANIZED HEALTH CARE EDUCATION/TRAINING PROGRAM

## 2022-11-22 PROCEDURE — 2580000003 HC RX 258: Performed by: INTERNAL MEDICINE

## 2022-11-22 PROCEDURE — 87807 RSV ASSAY W/OPTIC: CPT

## 2022-11-22 PROCEDURE — 87635 SARS-COV-2 COVID-19 AMP PRB: CPT

## 2022-11-22 PROCEDURE — 6370000000 HC RX 637 (ALT 250 FOR IP): Performed by: INTERNAL MEDICINE

## 2022-11-22 PROCEDURE — 71045 X-RAY EXAM CHEST 1 VIEW: CPT

## 2022-11-22 PROCEDURE — 6360000004 HC RX CONTRAST MEDICATION: Performed by: RADIOLOGY

## 2022-11-22 PROCEDURE — 96372 THER/PROPH/DIAG INJ SC/IM: CPT

## 2022-11-22 PROCEDURE — 36415 COLL VENOUS BLD VENIPUNCTURE: CPT

## 2022-11-22 PROCEDURE — 2580000003 HC RX 258: Performed by: STUDENT IN AN ORGANIZED HEALTH CARE EDUCATION/TRAINING PROGRAM

## 2022-11-22 PROCEDURE — 87502 INFLUENZA DNA AMP PROBE: CPT

## 2022-11-22 PROCEDURE — 84484 ASSAY OF TROPONIN QUANT: CPT

## 2022-11-22 PROCEDURE — 71275 CT ANGIOGRAPHY CHEST: CPT

## 2022-11-22 PROCEDURE — 94664 DEMO&/EVAL PT USE INHALER: CPT

## 2022-11-22 PROCEDURE — G8427 DOCREV CUR MEDS BY ELIG CLIN: HCPCS | Performed by: FAMILY MEDICINE

## 2022-11-22 PROCEDURE — 80053 COMPREHEN METABOLIC PANEL: CPT

## 2022-11-22 PROCEDURE — 6360000002 HC RX W HCPCS: Performed by: STUDENT IN AN ORGANIZED HEALTH CARE EDUCATION/TRAINING PROGRAM

## 2022-11-22 PROCEDURE — 83880 ASSAY OF NATRIURETIC PEPTIDE: CPT

## 2022-11-22 PROCEDURE — 6360000002 HC RX W HCPCS: Performed by: INTERNAL MEDICINE

## 2022-11-22 PROCEDURE — 83605 ASSAY OF LACTIC ACID: CPT

## 2022-11-22 PROCEDURE — 84145 PROCALCITONIN (PCT): CPT

## 2022-11-22 PROCEDURE — 2060000000 HC ICU INTERMEDIATE R&B

## 2022-11-22 RX ORDER — ONDANSETRON 2 MG/ML
4 INJECTION INTRAMUSCULAR; INTRAVENOUS EVERY 6 HOURS PRN
Status: DISCONTINUED | OUTPATIENT
Start: 2022-11-22 | End: 2022-11-23 | Stop reason: HOSPADM

## 2022-11-22 RX ORDER — DIPHENHYDRAMINE HYDROCHLORIDE 50 MG/ML
25 INJECTION INTRAMUSCULAR; INTRAVENOUS EVERY 6 HOURS PRN
Status: DISCONTINUED | OUTPATIENT
Start: 2022-11-22 | End: 2022-11-23 | Stop reason: HOSPADM

## 2022-11-22 RX ORDER — VALPROIC ACID 250 MG/5ML
250 SOLUTION ORAL EVERY 8 HOURS SCHEDULED
Status: DISCONTINUED | OUTPATIENT
Start: 2022-11-22 | End: 2022-11-23 | Stop reason: HOSPADM

## 2022-11-22 RX ORDER — ESOMEPRAZOLE MAGNESIUM 20 MG/1
20 FOR SUSPENSION ORAL DAILY
Status: DISCONTINUED | OUTPATIENT
Start: 2022-11-22 | End: 2022-11-22 | Stop reason: CLARIF

## 2022-11-22 RX ORDER — CETIRIZINE HYDROCHLORIDE 10 MG/1
5 TABLET ORAL DAILY
Status: DISCONTINUED | OUTPATIENT
Start: 2022-11-22 | End: 2022-11-23 | Stop reason: HOSPADM

## 2022-11-22 RX ORDER — 0.9 % SODIUM CHLORIDE 0.9 %
500 INTRAVENOUS SOLUTION INTRAVENOUS ONCE
Status: COMPLETED | OUTPATIENT
Start: 2022-11-22 | End: 2022-11-22

## 2022-11-22 RX ORDER — ETHOSUXIMIDE 250 MG/5ML
375 SOLUTION ORAL 2 TIMES DAILY
Status: DISCONTINUED | OUTPATIENT
Start: 2022-11-22 | End: 2022-11-23 | Stop reason: HOSPADM

## 2022-11-22 RX ORDER — ENOXAPARIN SODIUM 100 MG/ML
30 INJECTION SUBCUTANEOUS DAILY
Status: DISCONTINUED | OUTPATIENT
Start: 2022-11-22 | End: 2022-11-23 | Stop reason: HOSPADM

## 2022-11-22 RX ORDER — VALPROIC ACID 250 MG/5ML
250 SOLUTION ORAL ONCE
Status: DISCONTINUED | OUTPATIENT
Start: 2022-11-22 | End: 2022-11-23 | Stop reason: HOSPADM

## 2022-11-22 RX ORDER — METHYLPREDNISOLONE SODIUM SUCCINATE 40 MG/ML
40 INJECTION, POWDER, LYOPHILIZED, FOR SOLUTION INTRAMUSCULAR; INTRAVENOUS EVERY 12 HOURS
Status: DISCONTINUED | OUTPATIENT
Start: 2022-11-22 | End: 2022-11-22

## 2022-11-22 RX ORDER — SODIUM CHLORIDE 9 MG/ML
INJECTION, SOLUTION INTRAVENOUS CONTINUOUS
Status: DISCONTINUED | OUTPATIENT
Start: 2022-11-22 | End: 2022-11-23 | Stop reason: HOSPADM

## 2022-11-22 RX ORDER — IPRATROPIUM BROMIDE AND ALBUTEROL SULFATE 2.5; .5 MG/3ML; MG/3ML
3 SOLUTION RESPIRATORY (INHALATION) ONCE
Status: DISCONTINUED | OUTPATIENT
Start: 2022-11-22 | End: 2022-11-22

## 2022-11-22 RX ORDER — LORAZEPAM 2 MG/ML
1 INJECTION INTRAMUSCULAR ONCE
Status: COMPLETED | OUTPATIENT
Start: 2022-11-22 | End: 2022-11-22

## 2022-11-22 RX ORDER — POLYETHYLENE GLYCOL 3350 17 G/17G
17 POWDER, FOR SOLUTION ORAL DAILY PRN
Status: DISCONTINUED | OUTPATIENT
Start: 2022-11-22 | End: 2022-11-23 | Stop reason: HOSPADM

## 2022-11-22 RX ORDER — SODIUM CHLORIDE 0.9 % (FLUSH) 0.9 %
5-40 SYRINGE (ML) INJECTION PRN
Status: DISCONTINUED | OUTPATIENT
Start: 2022-11-22 | End: 2022-11-23 | Stop reason: HOSPADM

## 2022-11-22 RX ORDER — SODIUM CHLORIDE 9 MG/ML
INJECTION, SOLUTION INTRAVENOUS PRN
Status: DISCONTINUED | OUTPATIENT
Start: 2022-11-22 | End: 2022-11-23 | Stop reason: HOSPADM

## 2022-11-22 RX ORDER — IPRATROPIUM BROMIDE AND ALBUTEROL SULFATE 2.5; .5 MG/3ML; MG/3ML
1 SOLUTION RESPIRATORY (INHALATION)
Status: DISCONTINUED | OUTPATIENT
Start: 2022-11-22 | End: 2022-11-23 | Stop reason: HOSPADM

## 2022-11-22 RX ORDER — SODIUM CHLORIDE 0.9 % (FLUSH) 0.9 %
5-40 SYRINGE (ML) INJECTION EVERY 12 HOURS SCHEDULED
Status: DISCONTINUED | OUTPATIENT
Start: 2022-11-22 | End: 2022-11-23 | Stop reason: HOSPADM

## 2022-11-22 RX ORDER — ONDANSETRON 4 MG/1
4 TABLET, ORALLY DISINTEGRATING ORAL EVERY 8 HOURS PRN
Status: DISCONTINUED | OUTPATIENT
Start: 2022-11-22 | End: 2022-11-23 | Stop reason: HOSPADM

## 2022-11-22 RX ORDER — LANSOPRAZOLE
30 KIT
Status: DISCONTINUED | OUTPATIENT
Start: 2022-11-22 | End: 2022-11-23 | Stop reason: HOSPADM

## 2022-11-22 RX ORDER — ACETAMINOPHEN 650 MG/1
650 SUPPOSITORY RECTAL EVERY 6 HOURS PRN
Status: DISCONTINUED | OUTPATIENT
Start: 2022-11-22 | End: 2022-11-23

## 2022-11-22 RX ORDER — ACETAMINOPHEN 325 MG/1
650 TABLET ORAL EVERY 6 HOURS PRN
Status: DISCONTINUED | OUTPATIENT
Start: 2022-11-22 | End: 2022-11-23

## 2022-11-22 RX ADMIN — SODIUM CHLORIDE 500 ML: 9 INJECTION, SOLUTION INTRAVENOUS at 13:28

## 2022-11-22 RX ADMIN — IOPAMIDOL 60 ML: 755 INJECTION, SOLUTION INTRAVENOUS at 18:57

## 2022-11-22 RX ADMIN — SODIUM CHLORIDE 500 ML: 9 INJECTION, SOLUTION INTRAVENOUS at 16:59

## 2022-11-22 RX ADMIN — LORAZEPAM 1 MG: 2 INJECTION INTRAMUSCULAR; INTRAVENOUS at 13:00

## 2022-11-22 RX ADMIN — IPRATROPIUM BROMIDE AND ALBUTEROL SULFATE 1 AMPULE: .5; 2.5 SOLUTION RESPIRATORY (INHALATION) at 21:50

## 2022-11-22 RX ADMIN — ENOXAPARIN SODIUM 30 MG: 100 INJECTION SUBCUTANEOUS at 23:59

## 2022-11-22 RX ADMIN — DIPHENHYDRAMINE HYDROCHLORIDE 25 MG: 50 INJECTION, SOLUTION INTRAMUSCULAR; INTRAVENOUS at 23:39

## 2022-11-22 RX ADMIN — VALPROIC ACID 250 MG: 250 SOLUTION ORAL at 23:27

## 2022-11-22 RX ADMIN — SODIUM CHLORIDE, PRESERVATIVE FREE 10 ML: 5 INJECTION INTRAVENOUS at 23:41

## 2022-11-22 ASSESSMENT — PATIENT HEALTH QUESTIONNAIRE - PHQ9: DEPRESSION UNABLE TO ASSESS: FUNCTIONAL CAPACITY MOTIVATION LIMITS ACCURACY

## 2022-11-22 ASSESSMENT — PAIN - FUNCTIONAL ASSESSMENT
PAIN_FUNCTIONAL_ASSESSMENT: NONE - DENIES PAIN
PAIN_FUNCTIONAL_ASSESSMENT: WONG-BAKER FACES

## 2022-11-22 ASSESSMENT — PAIN SCALES - WONG BAKER: WONGBAKER_NUMERICALRESPONSE: 0

## 2022-11-22 ASSESSMENT — ENCOUNTER SYMPTOMS: COUGH: 1

## 2022-11-22 NOTE — ED NOTES
Pt not cooperative with staff and combative when RN attempts IV insertion. Pt medicated with Ativan IM.      Tiffanie Zapata RN  11/22/22 [id number]

## 2022-11-22 NOTE — H&P
[facility] History and Physical      CHIEF COMPLAINT:  shortness of breath, diarrhea    History of Present Illness:      30year old male with past medical history of cerebral palsy, severe mental retardation, seizure disorder, H/O hirschsprung disease, presence of colostomy, presence of peg tube presents with diarrhea and shortness of breath. Mom is present at bedside. Apparently symptoms have been ongoing for 2-3 days. She denies any fevers. Informant(s) for H&P: Patient    REVIEW OF SYSTEMS:  A comprehensive review of systems was negative except for: what is in the HPI      PMH:  Past Medical History:   Diagnosis Date    Cerebral palsy ([address]     Hirschsprung disease     Hypospadias     Mental retardation     Pulmonic stenosis        Surgical History:  Past Surgical History:   Procedure Laterality Date    COLOSTOMY      FOOT SURGERY      GASTRIC FUNDOPLICATION      GASTROSTOMY TUBE PLACEMENT      PATENT DUCTUS ARTERIOUS LIGATION      RECTAL SURGERY      anal pull through x 2 for Hirschsprung's - then colostomy x 2    TESTICLE REMOVAL         Medications Prior to Admission:    Prior to Admission medications    Medication Sig Start Date End Date Taking?  Authorizing Provider   esomeprazole Magnesium (NEXIUM) 20 MG PACK 1 per g tube every day 7/13/22   Elizabeth Jeffries MD   Incontinence Supply Disposable ([employer] FITTED BRIEFS DAY/NIGHT MD) MISC USE AS DIRECTED 6/6/22 3/25/24  Elizabeth Jeffries MD   Incontinence Supply Disposable ([employer] DAY & NIGHT UNDERPADS) MISC 1 Package by Does not apply route daily 4/26/21   Elizabeth Jeffries MD   Ostomy Supplies (STOMAHESIVE PROTECTIVE) POWD by Other route Apply to affected area of stoma    Historical Provider, MD   Zinc Oxide (BALMEX EX) Apply topically Apply to [facility] site    Historical Provider, MD   Hydrocortisone (PREPARATION H EX) Apply topically Twice a day as needed    Historical Provider, MD   Oral Electrolytes (PEDIALYTE PO) Take by mouth prn Historical Provider, MD   Nutritional Supplements (ENSURE PO) Take by mouth 6 cans a day    Historical Provider, MD   loratadine 5 MG/5ML solution Take 5 mg by mouth daily 10 ml GT QD    Historical Provider, MD   valproate (DEPAKENE) 250 MG/5ML solution Take by mouth 3 times daily 5.0 ml tid    Historical Provider, MD   ethosuximide (ZARONTIN) 250 MG/5ML solution Take 7.5 mLs by mouth 2 times daily    Historical Provider, MD       Allergies:    Lactose, Lactose intolerance (gi), Other, Peg-2000 (covid-19 mrna vaccine component), and Seasonal    Social History:    reports that he has never smoked. He has never used smokeless tobacco. He reports that he does not drink alcohol and does not use drugs. Family History:   family history is not on file. PHYSICAL EXAM:  Vitals:  /75   Pulse (!) 117   Temp 98.1 °F (36.7 °C) (Temporal)   Resp 24   Ht 5' (1.524 m)   Wt 95 lb (43.1 kg)   SpO2 99%   BMI 18.55 kg/m²     General Appearance: alert but unable to assess orientation  Skin: warm and dry  Head: normocephalic and atraumatic  Eyes: pupils equal, round, and reactive to light, extraocular eye movements intact, conjunctivae normal  Neck: neck supple and non tender without mass   Pulmonary/Chest: clear to auscultation bilaterally- no wheezes, rales or rhonchi, normal air movement, no respiratory distress  Cardiovascular: normal rate, normal S1 and S2 and no carotid bruits  Abdomen: soft, non-tender, non-distended, normal bowel sounds, no masses or organomegaly  Extremities: no cyanosis, no clubbing and no edema  Neurologic: unable to assess        LABS:  Recent Labs     11/22/22  1206      K 4.4      CO2 27   BUN 9   CREATININE 0.7   GLUCOSE 83   CALCIUM 10.7*       Recent Labs     11/22/22  1206   WBC 12.8*   RBC 4.68   HGB 15.7   HCT 45.9   MCV 98.1   MCH 33.5   MCHC 34.2   RDW 12.5   *   MPV 14.1*       No results for input(s): POCGLU in the last 72 hours.       Radiology:   XR CHEST PORTABLE   Final Result   Peribronchial cuffing bilateral hilar prominence, correlate for acute   bronchitis or airway disease. EKG: Sinus tachycarida, rate 123, normal intervals and axis, no ST or T wave changes    ASSESSMENT:      Principal Problem:    RSV infection  Active Problems:    Sepsis ([address]  Resolved Problems:    * No resolved hospital problems. *      PLAN:    Sepsis 2/2 RSV infection - Will follow up with full viral respiratory panel. Check procalcitonin to R/O superimposed bacterial infection. Will start him on NS. Continue to monitor labs  Acute transaminitis 2/2 #1 - F/U with CMP and continue NS. H/P CP with severe mental retardation and is non verbal  S/P Colostomy  S/P PEG - Resume TF  H/O seizure disorder - Continue valproate  DVT prophylaxis - Lovenox        NOTE: This report was transcribed using voice recognition software. Every effort was made to ensure accuracy; however, inadvertent computerized transcription errors may be present.   Electronically signed by Rosalina Queen DO on 11/22/2022 at 5:13 PM

## 2022-11-22 NOTE — PROGRESS NOTES
Database initiated pharmacy and medications verified with the patients mother. She states he is nonverbal and can ambulate with a walker at baseline. He is NPO and gets ensure through a g-tube. Mother is willing to spend the night here with patient. At home he has a private duty caregiver 4 days a week.

## 2022-11-22 NOTE — PROGRESS NOTES
David Ferraro : 1985 Sex: male  Age: 30 y.o. Chief Complaint   Patient presents with    Fever    Cough     High risk individual presents today with mom with URI symptoms for 2 to 3 days. Mom's boyfriend had similar symptoms, he tested negative for influenza and COVID, has COPD and was treated with prednisone. Geovany Lawson has progressive loose paroxysmal cough, spitting mucus at times. Dry mucous membranes. Appears ill. He has had Pfizer COVID-vaccine x2 I do not see documentation of flu vaccine. He developed severe generalized rash/hives after second Pfizer vaccine. Since normalized. Risk of third vaccine reviewed. Contrasted mRNA with Es Rossi R/B reviewed. He is   NPO. G-tube feeds only. Follows with [facility] neurology. He had Pfizer Covid vaccine x2. had Tdap . Had Pneumovax  and we did discuss booster as well as Prevnar 13  the Pneumovax and Prevnar 13 x 1 year.  had chicken pox. Counseled on flu vaccine as well      Past Medical History:  extensive including cerebral palsy, severe mental retardation, nonverbal, history of Hirschsprung's Disease, colostomy, feeding tube, seizure disorder, history of valvular disease,  mild pulmonic stenosis (Neg 2017 but difficult exam), chronic allergic rhinitis, thrombocytopenia,  hypercalcemia, lympho cytosis, macrocytosis.      [address]: Dad  lymphoma/pneumonia 66     Social: living with mom       Most Recent Labs  CBC  Lab Results   Component Value Date/Time    WBC 7.5 2021 10:27 AM    WBC 7.7 2020 07:48 AM    WBC 12.5 2018 09:57 AM    RBC 4.89 2021 10:27 AM    RBC 4.54 2020 07:48 AM    RBC 4.63 2018 09:57 AM    HGB 16.3 2021 10:27 AM    HGB 15.2 2020 07:48 AM    HGB 15.8 2018 09:57 AM    HCT 47.5 2021 10:27 AM    HCT 45.6 2020 07:48 AM    HCT 46.2 2018 09:57 AM    MCV 97.1 2021 10:27 AM    .4 2020 07:48 AM    MCV 99.8 08/26/2018 09:57 AM     06/22/2021 10:27 AM     01/25/2020 07:48 AM     08/26/2018 09:57 AM      CMP  Lab Results   Component Value Date/Time     11/29/2021 12:25 PM     06/22/2021 10:27 AM     01/25/2020 07:48 AM    K 5.1 11/29/2021 12:25 PM    K 4.7 08/12/2021 12:34 PM    K 5.4 06/22/2021 10:27 AM     11/29/2021 12:25 PM     06/22/2021 10:27 AM     01/25/2020 07:48 AM    CO2 31 11/29/2021 12:25 PM    CO2 31 06/22/2021 10:27 AM    CO2 32 01/25/2020 07:48 AM    ANIONGAP 7 11/29/2021 12:25 PM    ANIONGAP 9 06/22/2021 10:27 AM    ANIONGAP 11 01/25/2020 07:48 AM    GLUCOSE 82 11/29/2021 12:25 PM    GLUCOSE 98 06/22/2021 10:27 AM    GLUCOSE 93 01/25/2020 07:48 AM    BUN 10 11/29/2021 12:25 PM    BUN 11 06/22/2021 10:27 AM    BUN 10 01/25/2020 07:48 AM    CREATININE 0.6 11/29/2021 12:25 PM    CREATININE 0.6 06/22/2021 10:27 AM    CREATININE 0.6 01/25/2020 07:48 AM    LABGLOM >60 11/29/2021 12:25 PM    LABGLOM >60 06/22/2021 10:27 AM    LABGLOM >60 01/25/2020 07:48 AM    GFRAA >60 11/29/2021 12:25 PM    GFRAA >60 06/22/2021 10:27 AM    GFRAA >60 01/25/2020 07:48 AM    CALCIUM 10.5 11/29/2021 12:25 PM    CALCIUM 10.8 06/22/2021 10:27 AM    CALCIUM 10.6 01/25/2020 07:48 AM    PROT 8.2 06/22/2021 10:27 AM    PROT 8.0 01/25/2020 07:48 AM    PROT 8.7 08/26/2018 09:57 AM    LABALBU 4.8 06/22/2021 10:27 AM    LABALBU 4.8 01/25/2020 07:48 AM    LABALBU 4.7 08/26/2018 09:57 AM    BILITOT 0.3 06/22/2021 10:27 AM    BILITOT 0.3 01/25/2020 07:48 AM    BILITOT 0.5 08/26/2018 09:57 AM    ALKPHOS 99 06/22/2021 10:27 AM    ALKPHOS 100 01/25/2020 07:48 AM    ALKPHOS 94 08/26/2018 09:57 AM    AST 22 06/22/2021 10:27 AM    AST 22 01/25/2020 07:48 AM    AST 21 08/26/2018 09:57 AM    ALT 14 06/22/2021 10:27 AM    ALT 12 01/25/2020 07:48 AM    ALT 13 08/26/2018 09:57 AM     A1C  No results found for: LABA1C  TSH  Lab Results   Component Value Date/Time    TSH 1.340 06/22/2021 10:27 AM TSH 2.380 01/25/2020 07:48 AM    TSH 1.380 08/25/2016 06:02 PM     FREET4  No results found for: S3BSGVX  LIPID  Lab Results   Component Value Date/Time    CHOL 123 06/22/2021 10:27 AM    CHOL 119 01/25/2020 07:48 AM    HDL 49 06/22/2021 10:27 AM    HDL 52 01/25/2020 07:48 AM    LDLCALC 62 06/22/2021 10:27 AM    LDLCALC 49 01/25/2020 07:48 AM    TRIG 62 06/22/2021 10:27 AM    TRIG 89 01/25/2020 07:48 AM     VITAMIN D  Lab Results   Component Value Date/Time    VITD25 80 11/29/2021 12:25 PM    VITD25 90 08/12/2021 12:34 PM    VITD25 97 06/22/2021 10:27 AM     MAGNESIUM  Lab Results   Component Value Date/Time    MG 2.0 06/22/2021 10:27 AM      PHOS  No results found for: PHOS   MELLISSA   No results found for: MELLISSA  RHEUMATOID FACTOR  No results found for: RF  PSA  No results found for: PSA   HEPATITIS C  No results found for: HCVABI  HIV  No results found for: FPM6BZJ, HIV1QT  UA  Lab Results   Component Value Date/Time    COLORU Yellow 01/09/2019 08:17 PM    CLARITYU Clear 01/09/2019 08:17 PM    GLUCOSEU Negative 01/09/2019 08:17 PM    BILIRUBINUR Negative 01/09/2019 08:17 PM    KETUA Negative 01/09/2019 08:17 PM    SPECGRAV >=1.030 01/09/2019 08:17 PM    BLOODU Negative 01/09/2019 08:17 PM    PHUR 5.5 01/09/2019 08:17 PM    PROTEINU Negative 01/09/2019 08:17 PM    UROBILINOGEN 0.2 01/09/2019 08:17 PM    NITRU Negative 01/09/2019 08:17 PM    LEUKOCYTESUR Negative 01/09/2019 08:17 PM     Urine Micro/Albumin Ratio  No results found for: MALBCR        Current Outpatient Medications:     esomeprazole Magnesium (NEXIUM) 20 MG PACK, 1 per g tube every day, Disp: 30 packet, Rfl: 5    Incontinence Supply Disposable ([employer] FITTED BRIEFS DAY/NIGHT MD) MISC, USE AS DIRECTED, Disp: 200 each, Rfl: 12    Incontinence Supply Disposable ([employer] DAY & NIGHT UNDERPADS) MISC, 1 Package by Does not apply route daily, Disp: 200 each, Rfl: 12    Ostomy Supplies (STOMAHESIVE PROTECTIVE) POWD, by Other route Apply to affected area of stoma, Disp: , Rfl:     Zinc Oxide (BALMEX EX), Apply topically Apply to christal site, Disp: , Rfl:     Hydrocortisone (PREPARATION H EX), Apply topically Twice a day as needed, Disp: , Rfl:     Oral Electrolytes (PEDIALYTE PO), Take by mouth prn, Disp: , Rfl:     Nutritional Supplements (ENSURE PO), Take by mouth 6 cans a day, Disp: , Rfl:     loratadine 5 MG/5ML solution, Take 5 mg by mouth daily 10 ml GT QD, Disp: , Rfl:     valproate (DEPAKENE) 250 MG/5ML solution, Take by mouth 3 times daily 5.0 ml tid, Disp: , Rfl:     ethosuximide (ZARONTIN) 250 MG/5ML solution, Take 7.5 mLs by mouth 2 times daily, Disp: , Rfl:   Allergies   Allergen Reactions    Lactose     Lactose Intolerance (Gi)     Other     Peg-2000 (Covid-19 Mrna Vaccine Component)      Rash generalized after second phizer vaccine    Seasonal        Past Medical History:   Diagnosis Date    Cerebral palsy ([facility] [address]     Hirschsprung disease     Hypospadias     Mental retardation     Pulmonic stenosis      Past Surgical History:   Procedure Laterality Date    COLOSTOMY      FOOT SURGERY      GASTRIC FUNDOPLICATION      GASTROSTOMY TUBE PLACEMENT      PATENT DUCTUS ARTERIOUS LIGATION      RECTAL SURGERY      anal pull through x 2 for Hirschsprung's - then colostomy x 2    TESTICLE REMOVAL       No family history on file.   Social History     Tobacco Use    Smoking status: Never    Smokeless tobacco: Never   Vaping Use    Vaping Use: Never used   Substance Use Topics    Alcohol use: No    Drug use: No      Social History     Social History Narrative    PMH:    Problem List: Infantile cerebral palsy, Congenital heart disease, Thrombocytopenic disorder, Seizure,    Severe mental retardation (I.Q. 20-34)         cerebral palsy, profound mental retardation, nonverbal,    history of Hirschsprung's Disease, colostomy, feeding tube, seizure disorder, history of valvular disease,    mild pulmonic stenosis (recent neg but difficult exam), chronic allergic rhinitis, thrombocytopenia, reviewed       Severe intellectual disabilities    chronic, getting excellent care    Gastroesophageal reflux disease without esophagitis    chronic, well controlled With use of Nexium through G-tube. R/B reviewed. Defers follow-up with GI unless symptoms. Hypercalcemia    trending down after stopping supplement. Monitor. Other means of evaluation reviewed, symptomatically stable And will stay conservative at this time. Plan blood work in [facility] by vitamin D    I do not like the nomenclature, this is Epics only diagnosis choice for elevated vitamin D. Trending down off supplementation. Asymptomatic      Hyperkalemia    chronically fluctuated. Hemolysis may be playing a role. Asymptomatic. Monitor. Plan blood work in ER        Other cerebral palsy ([facility] [address]  Counseled. Risk of complications reviewed. Care appears to be excellent. Forms completed today, 12/1/2021. Hirschsprung's disease  He should continue per GI     Pulmonary infundibular stenosis  Pulmonary stenosis. Continue per cardiology. Last echo apparently did not show it,  but mom not sure that they got a clear picture. Thrombocytopenia, unspecified ([facility] [address]  Has been stable. Plan blood work in ER     Seizure disorder [facility])  Continue per neurology. Mom defers levels to them. Tolerating medications. ADRs interactions reviewed. Seizure precautions. Other dysphagia  Is strict NPO. Has G-tube     Health maintenance examination  Counseled at length as above, 12/1/2021, encourage yearly Counseled on vaccines. Rash  Chronic intermittent irritation around the G-tube but does very well with compound cream through: Pharmacy including Maalox/Aquaphor/zinc oxide/nystatin/0.5% hydrocortisone, not for long-term use, may use 1 to 2 weeks on/1 week off as needed    Tartar buildup  Gets cleanings under anesthesia-counseled  Vitamin D deficiency  Trending down after supplementation stopped    Urinary retention   back to baseline. Continue per Dr. Hays Handler. Had renal ultrasound    Macrocytosis  Subsequently normalized  , Vitamin B12/folic acid actually high    Hypomagnesemia  On supplement. Monitor    No flowsheet data found. Plan as above. Counseled extensively and differential diagnoses relevant to above were reviewed, including serious etiologies, risks and complications, especially of left uncontrolled. If relevant, instructions and  alternatives to meds/treatment reviewed, as well as interactions, and  SE's/ADRs reviewed, notify immediately if any, discontinuing new meds if any. Plan made after discussion and shared decision making. Counseled extensively. My concerns reviewed in this pleasant high risk individual.  Recommend transfer to ER for further E/T, mom agrees and will proceed as noted. Follow-up after. PG         As long as symptoms steadily improve/resolve, and medical conditions follow the expected course, FU as below, sooner PRN. No follow-ups on file. Ross Reis MD    Patients are advised to check with insurance company to ensure coverage and to fully understand benefits and cost prior to any testing. This note was created with the assistance of voice recognition software. Document was reviewed however may contain grammatical errors.

## 2022-11-22 NOTE — PROGRESS NOTES
Unable to administer aerosol  treatment, patient combative  during treatment ,also with assistance of family member.

## 2022-11-22 NOTE — ED PROVIDER NOTES
HPI   51-year-old male patient with past history of CP, nonverbal, Blaen button, colostomy presented to emergency department for evaluation of cough, fever. Symptoms ongoing since Saturday, patient having persistent cough as per mom, they saw PCP sent in here for further evaluation. Patient very tachycardic on arrival.  No vomiting or diarrhea noted. Rest of the history is limited secondary to patient being nonverbal.  Patient lives with mother she is his primary caretaker. Review of Systems   Unable to perform ROS: Patient nonverbal   Constitutional:  Positive for fever. HENT:  Positive for congestion. Respiratory:  Positive for cough. Physical Exam  Vitals and nursing note reviewed. Constitutional:       Appearance: He is well-developed. HENT:      Head: Normocephalic and atraumatic. Eyes:      Conjunctiva/sclera: Conjunctivae normal.   Cardiovascular:      Rate and Rhythm: Regular rhythm. Tachycardia present. Heart sounds: Normal heart sounds. No murmur heard. Pulmonary:      Effort: Pulmonary effort is normal.      Comments: Lung sounds are clear, persistently coughing on examination. Abdominal:      General: Bowel sounds are normal.      Palpations: Abdomen is soft. Tenderness: There is no abdominal tenderness. There is no guarding or rebound. Comments: Blane button in place, colostomy present. Musculoskeletal:         General: No tenderness or deformity. Cervical back: Normal range of motion and neck supple. Right lower leg: No edema. Left lower leg: No edema. Skin:     General: Skin is warm and dry. Neurological:      Mental Status: He is alert and oriented to person, place, and time. Cranial Nerves: No cranial nerve deficit. Coordination: Coordination normal.        Procedures     MDM  This a 51-year-old male patient presents to emergency department for evaluation of cough tachycardia fever. Found to have RSV here.   Chest x-ray showing viral pattern, mild lactic acidosis of 2.6. Mild leukocytosis of 12.8. Patient here persistently tachycardic despite IV fluids. Very high risk for acute decompensation as he is nonverbal.  Plan for admission at this time. ED Course as of 11/22/22 1727 Tue Nov 22, 2022   1241 EKG showing sinus rhythm, sinus tachycardia at 123 bpm, no ST elevations or depressions. Normal QTC, no prior to compare to. [FG]   8855 Persistently tachycardic on reevaluation. Discussed plan with mom she is agreeable for admission at this time. [FG]   1425 Spoke with Dr. Trevon Flynn will admit [FG]      ED Course User Index  [FG] Sofia Ford,      ED Course as of 11/22/22 1727 Tue Nov 22, 2022   1241 EKG showing sinus rhythm, sinus tachycardia at 123 bpm, no ST elevations or depressions. Normal QTC, no prior to compare to. [FG]   3843 Persistently tachycardic on reevaluation. Discussed plan with mom she is agreeable for admission at this time. [FG]   46 Spoke with Dr. Trevon Flynn will admit [FG]      ED Course User Index  [FG] Sofia Ford, DO       --------------------------------------------- PAST HISTORY ---------------------------------------------  Past Medical History:  has a past medical history of [facility] Hirschsprung disease, Hypospadias, Mental retardation, and Pulmonic stenosis. Past Surgical History:  has a past surgical history that includes colostomy; Gastrostomy tube placement; Patent ductus arterious ligation; Foot surgery; Testicle removal; Gastric fundoplication; and Rectal surgery. Social History:  reports that he has never smoked. He has never used smokeless tobacco. He reports that he does not drink alcohol and does not use drugs. Family History: family history is not on file. The patients home medications have been reviewed.     Allergies: Lactose, Lactose intolerance (gi), Other, Peg-2000 (covid-19 mrna vaccine component), and Seasonal    -------------------------------------------------- RESULTS -------------------------------------------------    LABS:  Results for orders placed or performed during the hospital encounter of 11/22/22   COVID-19, Rapid    Specimen: Nasopharyngeal Swab   Result Value Ref Range    SARS-CoV-2, NAAT Not Detected Not Detected   Rapid influenza A/B antigens    Specimen: Nasopharyngeal   Result Value Ref Range    Influenza A by PCR Not Detected Not Detected    Influenza B by PCR Not Detected Not Detected   Rapid RSV Antigen    Specimen: Nasopharyngeal Swab   Result Value Ref Range    RSV by PCR POSITIVE (A) Negative   Lactate, Sepsis   Result Value Ref Range    Lactic Acid, Sepsis 2.6 (H) 0.5 - 1.9 mmol/L   CBC with Auto Differential   Result Value Ref Range    WBC 12.8 (H) 4.5 - 11.5 E9/L    RBC 4.68 3.80 - 5.80 E12/L    Hemoglobin 15.7 12.5 - 16.5 g/dL    Hematocrit 45.9 37.0 - 54.0 %    MCV 98.1 80.0 - 99.9 fL    MCH 33.5 26.0 - 35.0 pg    MCHC 34.2 32.0 - 34.5 %    RDW 12.5 11.5 - 15.0 fL    Platelets 636 (L) 907 - 450 E9/L    MPV 14.1 (H) 7.0 - 12.0 fL    Neutrophils % 76.2 43.0 - 80.0 %    Immature Granulocytes % 0.4 0.0 - 5.0 %    Lymphocytes % 8.1 (L) 20.0 - 42.0 %    Monocytes % 14.9 (H) 2.0 - 12.0 %    Eosinophils % 0.2 0.0 - 6.0 %    Basophils % 0.2 0.0 - 2.0 %    Neutrophils Absolute 9.75 (H) 1.80 - 7.30 E9/L    Immature Granulocytes # 0.05 E9/L    Lymphocytes Absolute 1.04 (L) 1.50 - 4.00 E9/L    Monocytes Absolute 1.90 (H) 0.10 - 0.95 E9/L    Eosinophils Absolute 0.02 (L) 0.05 - 0.50 E9/L    Basophils Absolute 0.03 0.00 - 0.20 E9/L    Vacuolated Neutrophils 1+     RBC Morphology Normal    Comprehensive Metabolic Panel w/ Reflex to MG   Result Value Ref Range    Sodium 141 132 - 146 mmol/L    Potassium reflex Magnesium 4.4 3.5 - 5.0 mmol/L    Chloride 100 98 - 107 mmol/L    CO2 27 22 - 29 mmol/L    Anion Gap 14 7 - 16 mmol/L    Glucose 83 74 - 99 mg/dL    BUN 9 6 - 20 mg/dL    Creatinine 0.7 0.7 - 1.2 mg/dL    Est, Glom Filt Rate >60 >=60 mL/min/1.73    Calcium 10.7 (H) 8.6 - 10.2 mg/dL    Total Protein 7.8 6.4 - 8.3 g/dL    Albumin 3.9 3.5 - 5.2 g/dL    Total Bilirubin 0.4 0.0 - 1.2 mg/dL    Alkaline Phosphatase 136 (H) 40 - 129 U/L    ALT 43 (H) 0 - 40 U/L    AST 61 (H) 0 - 39 U/L   Troponin   Result Value Ref Range    Troponin, High Sensitivity 9 0 - 11 ng/L   Brain Natriuretic Peptide   Result Value Ref Range    Pro- (H) 0 - 125 pg/mL   EKG 12 Lead   Result Value Ref Range    Ventricular Rate 123 BPM    Atrial Rate 123 BPM    P-R Interval 122 ms    QRS Duration 88 ms    Q-T Interval 318 ms    QTc Calculation (Bazett) 455 ms    P Axis 56 degrees    R Axis 73 degrees    T Axis 20 degrees       RADIOLOGY:  XR CHEST PORTABLE   Final Result   Peribronchial cuffing bilateral hilar prominence, correlate for acute   bronchitis or airway disease. CTA CHEST W CONTRAST    (Results Pending)           ------------------------- NURSING NOTES AND VITALS REVIEWED ---------------------------  Date / Time Roomed:  11/22/2022 11:45 AM  ED Bed Assignment:  22/22    The nursing notes within the ED encounter and vital signs as below have been reviewed. Patient Vitals for the past 24 hrs:   BP Temp Temp src Pulse Resp SpO2 Height Weight   11/22/22 1659 -- -- -- (!) 117 24 -- -- --   11/22/22 1654 -- -- -- (!) 118 -- -- -- --   11/22/22 1425 -- -- -- (!) 113 -- -- -- --   11/22/22 1312 120/75 -- -- (!) 121 18 99 % -- --   11/22/22 1141 124/82 98.1 °F (36.7 °C) Temporal (!) 136 18 98 % 5' (1.524 m) 95 lb (43.1 kg)       Oxygen Saturation Interpretation: Normal    ------------------------------------------ PROGRESS NOTES ------------------------------------------    Counseling:  I have spoken with the patient and discussed todays results, in addition to providing specific details for the plan of care and counseling regarding the diagnosis and prognosis.   Their questions are answered at this time and they are agreeable with the plan of admission.    --------------------------------- ADDITIONAL PROVIDER NOTES ---------------------------------  Consultations:  Spoke with Dr. Spring Gutierrez. Discussed case. They will admit the patient. This patient's ED course included: a personal history and physicial examination, re-evaluation prior to disposition, multiple bedside re-evaluations, IV medications, and cardiac monitoring    This patient has remained hemodynamically stable during their ED course. Diagnosis:  1. Respiratory syncytial virus (RSV)    2. Tachycardia        Disposition:  Patient's disposition: Admit to telemetry  Patient's condition is stable.          Haylee Quintanilla DO  Resident  11/22/22 3066

## 2022-11-23 VITALS
BODY MASS INDEX: 18.87 KG/M2 | HEIGHT: 60 IN | TEMPERATURE: 99.5 F | RESPIRATION RATE: 18 BRPM | HEART RATE: 117 BPM | SYSTOLIC BLOOD PRESSURE: 118 MMHG | OXYGEN SATURATION: 98 % | DIASTOLIC BLOOD PRESSURE: 61 MMHG | WEIGHT: 96.1 LBS

## 2022-11-23 LAB
ADENOVIRUS BY PCR: NOT DETECTED
ANION GAP SERPL CALCULATED.3IONS-SCNC: 11 MMOL/L (ref 7–16)
BORDETELLA PARAPERTUSSIS BY PCR: NOT DETECTED
BORDETELLA PERTUSSIS BY PCR: NOT DETECTED
BUN BLDV-MCNC: 8 MG/DL (ref 6–20)
CALCIUM SERPL-MCNC: 9.7 MG/DL (ref 8.6–10.2)
CHLAMYDOPHILIA PNEUMONIAE BY PCR: NOT DETECTED
CHLORIDE BLD-SCNC: 104 MMOL/L (ref 98–107)
CO2: 24 MMOL/L (ref 22–29)
CORONAVIRUS 229E BY PCR: NOT DETECTED
CORONAVIRUS HKU1 BY PCR: NOT DETECTED
CORONAVIRUS NL63 BY PCR: NOT DETECTED
CORONAVIRUS OC43 BY PCR: NOT DETECTED
CREAT SERPL-MCNC: 0.6 MG/DL (ref 0.7–1.2)
GFR SERPL CREATININE-BSD FRML MDRD: >60 ML/MIN/1.73
GLUCOSE BLD-MCNC: 95 MG/DL (ref 74–99)
HCT VFR BLD CALC: 41.1 % (ref 37–54)
HEMOGLOBIN: 13.8 G/DL (ref 12.5–16.5)
HUMAN METAPNEUMOVIRUS BY PCR: NOT DETECTED
HUMAN RHINOVIRUS/ENTEROVIRUS BY PCR: NOT DETECTED
INFLUENZA A BY PCR: NOT DETECTED
INFLUENZA B BY PCR: NOT DETECTED
MCH RBC QN AUTO: 33.3 PG (ref 26–35)
MCHC RBC AUTO-ENTMCNC: 33.6 % (ref 32–34.5)
MCV RBC AUTO: 99.3 FL (ref 80–99.9)
MYCOPLASMA PNEUMONIAE BY PCR: NOT DETECTED
PARAINFLUENZA VIRUS 1 BY PCR: NOT DETECTED
PARAINFLUENZA VIRUS 2 BY PCR: NOT DETECTED
PARAINFLUENZA VIRUS 3 BY PCR: NOT DETECTED
PARAINFLUENZA VIRUS 4 BY PCR: NOT DETECTED
PDW BLD-RTO: 12.5 FL (ref 11.5–15)
PLATELET # BLD: 94 E9/L (ref 130–450)
PLATELET CONFIRMATION: NORMAL
PMV BLD AUTO: 13.7 FL (ref 7–12)
POTASSIUM SERPL-SCNC: 3.9 MMOL/L (ref 3.5–5)
RBC # BLD: 4.14 E12/L (ref 3.8–5.8)
RESPIRATORY SYNCYTIAL VIRUS BY PCR: DETECTED
SARS-COV-2, PCR: NOT DETECTED
SODIUM BLD-SCNC: 139 MMOL/L (ref 132–146)
WBC # BLD: 10.5 E9/L (ref 4.5–11.5)

## 2022-11-23 PROCEDURE — 2580000003 HC RX 258: Performed by: INTERNAL MEDICINE

## 2022-11-23 PROCEDURE — 0202U NFCT DS 22 TRGT SARS-COV-2: CPT

## 2022-11-23 PROCEDURE — 6360000002 HC RX W HCPCS: Performed by: INTERNAL MEDICINE

## 2022-11-23 PROCEDURE — 80048 BASIC METABOLIC PNL TOTAL CA: CPT

## 2022-11-23 PROCEDURE — 85027 COMPLETE CBC AUTOMATED: CPT

## 2022-11-23 PROCEDURE — 99239 HOSP IP/OBS DSCHRG MGMT >30: CPT | Performed by: INTERNAL MEDICINE

## 2022-11-23 PROCEDURE — 36415 COLL VENOUS BLD VENIPUNCTURE: CPT

## 2022-11-23 PROCEDURE — 6370000000 HC RX 637 (ALT 250 FOR IP): Performed by: INTERNAL MEDICINE

## 2022-11-23 PROCEDURE — 6370000000 HC RX 637 (ALT 250 FOR IP): Performed by: NURSE PRACTITIONER

## 2022-11-23 RX ORDER — GUAIFENESIN 600 MG/1
1200 TABLET, EXTENDED RELEASE ORAL 2 TIMES DAILY
Qty: 40 TABLET | Refills: 0 | Status: SHIPPED | OUTPATIENT
Start: 2022-11-23 | End: 2022-12-03

## 2022-11-23 RX ORDER — ACETAMINOPHEN 160 MG/5ML
650 SOLUTION ORAL EVERY 6 HOURS PRN
Status: DISCONTINUED | OUTPATIENT
Start: 2022-11-23 | End: 2022-11-23 | Stop reason: HOSPADM

## 2022-11-23 RX ADMIN — ACETAMINOPHEN ORAL SOLUTION 650 MG: 650 SOLUTION ORAL at 06:46

## 2022-11-23 RX ADMIN — ETHOSUXIMIDE 375 MG: 250 SOLUTION ORAL at 08:55

## 2022-11-23 RX ADMIN — VALPROIC ACID 250 MG: 250 SOLUTION ORAL at 06:26

## 2022-11-23 RX ADMIN — SODIUM CHLORIDE: 9 INJECTION, SOLUTION INTRAVENOUS at 00:02

## 2022-11-23 RX ADMIN — ENOXAPARIN SODIUM 30 MG: 100 INJECTION SUBCUTANEOUS at 08:56

## 2022-11-23 RX ADMIN — LANSOPRAZOLE 30 MG: KIT at 06:27

## 2022-11-23 RX ADMIN — CETIRIZINE HYDROCHLORIDE 5 MG: 10 TABLET, FILM COATED ORAL at 08:55

## 2022-11-23 ASSESSMENT — PAIN DESCRIPTION - LOCATION: LOCATION: HEAD

## 2022-11-23 ASSESSMENT — PAIN SCALES - GENERAL: PAINLEVEL_OUTOF10: 3

## 2022-11-23 ASSESSMENT — PAIN SCALES - WONG BAKER: WONGBAKER_NUMERICALRESPONSE: 0

## 2022-11-23 ASSESSMENT — PAIN DESCRIPTION - DESCRIPTORS: DESCRIPTORS: ACHING

## 2022-11-23 NOTE — PROGRESS NOTES
Comprehensive Nutrition Assessment    Type and Reason for Visit:  Initial, Positive Nutrition Screen    Nutrition Recommendations/Plan:   Continue current TF order, as tolerated (TF from home=current TF order and provides ~100% energy, protein and fluid needs needs)  Continue inpatient monitoring     Malnutrition Assessment:  Malnutrition Status: At risk for malnutrition (Comment) (11/23/22 1036)    Context:  Chronic Illness     Findings of the 6 clinical characteristics of malnutrition:  Energy Intake:  No significant decrease in energy intake  Weight Loss:  No significant weight loss     Body Fat Loss:  Unable to assess (RSV isolation)     Muscle Mass Loss:  Unable to assess (RSV isolation)    Fluid Accumulation:  No significant fluid accumulation     Strength:  Not Performed    Nutrition Assessment:    Pt admit w/RSV. PMH=Cerebral palsy, MRDD, Hirschsprung disease, G-tube for EN, colostomy. Discussed TF with pt's mom. Since pt has been on Ensure Original via TFand is ordered this formula, pt's mom Tasia Claude is agreeable to bring them in from home, since this is not on our formulary as a TF (or ONS). She prefers this to changing his formula here at the hospital to one of our formulary products. Will continue to monitor pt's tolerance and status changes. Nutrition Related Findings:    contracted extremities, colostomy, G-tube, nonverbal, no edema, I/O WNL, diarrhea PTA x 3 d Wound Type: None       Current Nutrition Intake & Therapies:    Average Meal Intake: NPO  Average Supplements Intake: NPO  Current Tube Feeding (TF) Orders:  Feeding Route: PEG  Formula: Other Tube Feeding (Comment) (Ensure Original)  Schedule:  Bolus  Feeding Regimen: 1 can of Ensure Original TID bolus and 3 cans at night continuous at 30 ml/hr for total 6 cans/day per pt's mom  Additives/Modulars: None  Water Flushes: 30 ml before and after each bolus = 180 ml/d  Current TF & Flush Orders Provides: at goal  Goal TF & Flush Orders Provides: 1440 ml/d, 1500 deana, 54 g pro, 1356 total free water    Anthropometric Measures:  Height: 5' (152.4 cm)  Ideal Body Weight (IBW): 106 lbs (48 kg)    Admission Body Weight: 101 lb 6.4 oz (46 kg) (11/22 bedscale)  Current Body Weight: 96 lb 1.6 oz (43.6 kg), 90.7 % IBW. Weight Source: Bed Scale (11/23)  Current BMI (kg/m2): 18.8  Usual Body Weight: 98 lb (44.5 kg) (per PCP physical x 1 yr ago)  % Weight Change (Calculated): -1.9                    BMI Categories: Normal Weight (BMI 18.5-24. 9)    Estimated Daily Nutrient Needs:  Energy Requirements Based On: Kcal/kg  Weight Used for Energy Requirements: Current  Energy (kcal/day):  (25-28 deana/kg)  Weight Used for Protein Requirements: Current  Protein (g/day): 55-65 (1.3-1.5 g/kg)  Method Used for Fluid Requirements: 1 ml/kcal  Fluid (ml/day):     Nutrition Diagnosis:   Inadequate oral intake related to cognitive or neurological impairment as evidenced by NPO or clear liquid status due to medical condition, nutrition support - enteral nutrition    Nutrition Interventions:   Food and/or Nutrient Delivery: Continue Current Tube Feeding (Pt's family to provide Ensure from home, as ordered)  Nutrition Education/Counseling: Education not indicated  Coordination of Nutrition Care: Continue to monitor while inpatient       Goals:     Goals:  Tolerate nutrition support at goal rate, by next RD assessment       Nutrition Monitoring and Evaluation:   Behavioral-Environmental Outcomes: None Identified  Food/Nutrient Intake Outcomes: Enteral Nutrition Intake/Tolerance  Physical Signs/Symptoms Outcomes: GI Status, Biochemical Data, Fluid Status or Edema, Nutrition Focused Physical Findings, Skin, Weight, Diarrhea    Discharge Planning:    Enteral Nutrition     Sharon Waters RD, [address] , [place]  Contact: [phone]

## 2022-11-23 NOTE — CARE COORDINATION
+ RSV. Hx MR, cerebral palsy, non verbal. Met w/ mother Lizeth Moore. Explained role of  and plan of care. Lives w/ Lizeth Moore in a ranch home- 1 steps to entrance. Has WW,WC, walk-in tub, PEG/Blane button/ tube feeding (supplies through [facility]), colostomy(supplies through [facility]). Has caregivers through waiver program 4 days/week- 42 hours total /week. PCP is Dr. Elijah Resendez and pharmacy is [facility]. Per mother, plan is for pt to return home on discharge- she will provide transportation. Anticipating no needs.  Will follow Attila Pardo RN case manager

## 2022-11-23 NOTE — PLAN OF CARE
Problem: Skin/Tissue Integrity  Goal: Absence of new skin breakdown  Description: 1. Monitor for areas of redness and/or skin breakdown  2. Assess vascular access sites hourly  3. Every 4-6 hours minimum:  Change oxygen saturation probe site  4. Every 4-6 hours:  If on nasal continuous positive airway pressure, respiratory therapy assess nares and determine need for appliance change or resting period.   Outcome: Progressing     Problem: Discharge Planning  Goal: Discharge to home or other facility with appropriate resources  Outcome: Progressing     Problem: Pain  Goal: Verbalizes/displays adequate comfort level or baseline comfort level  Outcome: Progressing

## 2022-11-23 NOTE — DISCHARGE SUMMARY
[facility] Physician Discharge Summary       No follow-up provider specified. Activity level: As tolerated     Dispo: Home      Condition on discharge: Stable     Patient ID:  Cheryl Barreto  [id number]  45 y.o.  1985    Admit date: 11/22/2022    Discharge date and time:  11/23/2022  12:34 PM    Admission Diagnoses: Principal Problem:    RSV infection  Active Problems:    Sepsis ([facility]  Resolved Problems:    * No resolved hospital problems. *      Discharge Diagnoses: Principal Problem:    RSV infection  Active Problems:    Sepsis ([facility]  Resolved Problems:    * No resolved hospital problems. *      Consults:  None    Procedures: None    Hospital Course:   Patient Cheryl Barreto is a 30 y.o. presented with Respiratory syncytial virus (RSV) [B33.8]  Tachycardia [R00.0]  RSV infection [B33.8]  Sepsis ([facility] [A399]    30year old male with past medical history of cerebral palsy, status post colostomy, status post PEG placement, and history of seizure disorder presents with agitation and shortness of breath. Patient was found to be positive for RSV. Currently he is not requiring any oxygenation. Does not seem to have a superimposed bacterial pneumonia. I discussed the case with patient's mother. She states that she feels comfortable taking him home. Patient does remain slightly tachycardic however I do not believe it is because of hypoperfusion rather he sometimes just gets agitated. Mother is aware.     Discharge Exam:    General Appearance: alert and oriented to person, place and time and in no acute distress  Skin: warm and dry  Head: normocephalic and atraumatic  Eyes: pupils equal, round, and reactive to light, extraocular eye movements intact, conjunctivae normal  Neck: neck supple and non tender without mass   Pulmonary/Chest: clear to auscultation bilaterally- no wheezes, rales or rhonchi, normal air movement, no respiratory distress  Cardiovascular: normal rate, normal S1 and S2 and no carotid bruits  Abdomen: soft, non-tender, non-distended, normal bowel sounds, no masses or organomegaly  Extremities: no cyanosis, no clubbing and no edema  Neurologic: no cranial nerve deficit and speech normal    I/O last 3 completed shifts: In: 500 [IV Piggyback:500]  Out: -   No intake/output data recorded. LABS:  Recent Labs     11/22/22  1206 11/23/22  0430    139   K 4.4 3.9    104   CO2 27 24   BUN 9 8   CREATININE 0.7 0.6*   GLUCOSE 83 95   CALCIUM 10.7* 9.7       Recent Labs     11/22/22  1206 11/23/22  0430   WBC 12.8* 10.5   RBC 4.68 4.14   HGB 15.7 13.8   HCT 45.9 41.1   MCV 98.1 99.3   MCH 33.5 33.3   MCHC 34.2 33.6   RDW 12.5 12.5   * 94*   MPV 14.1* 13.7*       No results for input(s): POCGLU in the last 72 hours. Imaging:  XR CHEST PORTABLE    Result Date: 11/22/2022  EXAMINATION: ONE XRAY VIEW OF THE CHEST 11/22/2022 12:23 pm COMPARISON: None. HISTORY: ORDERING SYSTEM PROVIDED HISTORY: fever, eval pna TECHNOLOGIST PROVIDED HISTORY: Reason for exam:->fever, eval pna FINDINGS: Poor inspiratory effort is seen. The cardiomediastinal silhouette is without acute process. Peribronchial cuffing bilateral hilar prominence is seen, mild prominence of the bronchovascular interstitial lung markings is seen that is partially artifactual due to the poor inspiratory effort. Biapical prominence suggestive of COPD changes. No evidence of focal consolidation is seen. No evidence of pneumothorax or pleural effusion. The osseous structures are without acute process. Peribronchial cuffing bilateral hilar prominence, correlate for acute bronchitis or airway disease. CTA CHEST W CONTRAST    Result Date: 11/22/2022  EXAMINATION: CTA OF THE CHEST 11/22/2022 6:57 pm TECHNIQUE: CTA of the chest was performed after the administration of intravenous contrast.  Multiplanar reformatted images are provided for review. MIP images are provided for review.  Automated exposure control, iterative reconstruction, and/or weight based adjustment of the mA/kV was utilized to reduce the radiation dose to as low as reasonably achievable. COMPARISON: 2-14 HISTORY: ORDERING SYSTEM PROVIDED HISTORY: r/o pe TECHNOLOGIST PROVIDED HISTORY: Reason for exam:->r/o pe FINDINGS: Pulmonary Arteries: Within artifacts present, mainly motion and beam hardening, no definite filling defects of acute PE are identified. Apparent nominally superior segment left lower lobe region noted Mediastinum: Pulmonary outflow tract is enlarged. The lymph nodes such as subcarinal reach around upper limits of normal size probably benign but nonspecific. Small a moderate size hiatal hernia, paraesophageal, appears to be present Lungs/pleura: Scattered pulmonary ill-defined somewhat nodular opacities most conspicuous left lower lobe suggesting infiltrates Upper Abdomen: Gastrostomy is evident. Evaluation is very limited otherwise Soft Tissues/Bones: Pectus excavatum noted, and scoliosis     1. Within limitations, no acute pulmonary embolus is identified 2.  Suggestion of pulmonary infiltrates most conspicuous in the left lower lobe       Patient Instructions:      Medication List        START taking these medications      guaiFENesin 600 MG extended release tablet  Commonly known as: MUCINEX  Take 2 tablets by mouth 2 times daily for 10 days            CONTINUE taking these medications      BALMEX EX     * [employer] Day & Night Underpads Misc  1 Package by Does not apply route daily     * [employer] Fitted Briefs Day/Night Md Misc  USE AS DIRECTED     ENSURE PO     esomeprazole Magnesium 20 MG Pack  Commonly known as: NEXIUM  1 per g tube every day     ethosuximide 250 MG/5ML solution  Commonly known as: ZARONTIN     loratadine 5 MG/5ML solution     PEDIALYTE PO     PREPARATION H EX     Stomahesive Protective Powd     valproate 250 MG/5ML solution  Commonly known as: DEPAKENE           * This list has 2 medication(s) that are the same as other medications prescribed for you. Read the directions carefully, and ask your doctor or other care provider to review them with you.                    Where to Get Your Medications        You can get these medications from any pharmacy    Bring a paper prescription for each of these medications  guaiFENesin 600 MG extended release tablet           Note that 36 minutes was spent in preparing discharge papers, discussing discharge with patient, medication review, etc.    Signed:  Electronically signed by Jm Noland DO on 11/23/2022 at 12:34 PM

## 2022-11-25 ENCOUNTER — CARE COORDINATION (OUTPATIENT)
Dept: CASE MANAGEMENT | Age: 37
End: 2022-11-25

## 2022-11-25 NOTE — CARE COORDINATION
[facility] Care Transitions Initial Follow Up Call    Call within 2 business days of discharge: Yes    Care Transition Nurse attempted initial CT outreach leaving Hipaa VM w/ my outreach info. Patient: Dulce Hidalgo Patient : 1985   MRN: <[id number]>  Reason for Admission: 2022 - 2022 [facility]. RSV, Sepsis. Pt has h/o CP, PEG/Colostomy. Discharge Date: 22 RARS: Readmission Risk Score: 13.1  NR  CT    P [facility] CLINICAL STAFF routed to schedule 7 day hosp fu PCP. START taking:  guaiFENesin [facility])    Last Discharge  Street       Date Complaint Diagnosis Description Type Department Provider    22 Cough; Tachycardia; Fever Respiratory syncytial virus (RSV) . .. ED to Hosp-Admission (Discharged) (ADMITTED) [facility]W Stephany Valenzuela DO; Lilian Thomas. ..             Sherrie Araujo RN

## 2022-11-27 LAB
BLOOD CULTURE, ROUTINE: NORMAL
CULTURE, BLOOD 2: NORMAL

## 2022-11-28 ENCOUNTER — CARE COORDINATION (OUTPATIENT)
Dept: CASE MANAGEMENT | Age: 37
End: 2022-11-28

## 2022-11-28 DIAGNOSIS — B33.8 RSV INFECTION: Primary | ICD-10-CM

## 2022-11-28 PROCEDURE — 1111F DSCHRG MED/CURRENT MED MERGE: CPT | Performed by: FAMILY MEDICINE

## 2022-11-28 NOTE — CARE COORDINATION
[facility] Care Transitions Initial Follow Up Call    Call within 2 business days of discharge: Yes    Care Transition Nurse contacted the patient by telephone to perform post hospital discharge assessment. Verified name and  with patient as identifiers. Provided introduction to self, and explanation of the Care Transition Nurse role. Patient: Jazmin Whyte Patient : 1985   MRN: <[id number]>  Reason for Admission:  2022 - 2022 [facility]. RSV, Sepsis. Pt has h/o CP, PEG/Colostomy. Discharge Date: 22 RARS: Readmission Risk Score: 13.1  NR  CT      START taking:  guaiFENesin [facility])    Last Discharge  Street       Date Complaint Diagnosis Description Type Department Provider    22 Cough; Tachycardia; Fever Respiratory syncytial virus (RSV) . .. ED to Hosp-Admission (Discharged) (ADMITTED) [facility] Stephany Gutierrez DO; Ronny Ayers. .. Was this an external facility discharge? No Discharge Facility:     Challenges to be reviewed by the provider   Additional needs identified to be addressed with provider: No  none               Method of communication with provider: none. Parent/Meredith reports pt is doing \"much better\". States continues to have coughing and runny nose. No fevers, chills, or flu-like symptoms. PEG and colostomy function normal. Has/taking meds as directed. Feels Mucinex beneficial. No home needs. Andrez Ashlie states she will schedule 1 wk hosp fu PCP. Care Transition Nurse reviewed discharge instructions with parent who verbalized understanding. The parent was given an opportunity to ask questions and does not have any further questions or concerns at this time. Were discharge instructions available to patient? Yes. Reviewed appropriate site of care based on symptoms and resources available to patient including: When to call 911. The parent agrees to contact the PCP office for questions related to their healthcare.      Advance Care Planning:   Does patient have an Advance Directive: Pavithra Solomon primary decision maker [phone]       Medication reconciliation was performed with parent, who verbalizes understanding of administration of home medications. Medications reviewed, 1111F entered: yes    Was patient discharged with a pulse oximeter? no    Non-face-to-face services provided:  Reviewed to return to ED for any resp difficulty/distress. Advised to report fevers, chills, or flu-like symptoms to physician. Offered patient enrollment in the Remote Patient Monitoring (RPM) program for in-home monitoring: Patient is not eligible for RPM program.    Care Transitions 24 Hour Call    Do you have a copy of your discharge instructions?: Yes  Do you have all of your prescriptions and are they filled?: Yes  Have you scheduled your follow up appointment?: No  Do you feel like you have everything you need to keep you well at home?: Yes  Care Transitions Interventions         Follow Up  No future appointments. Care Transition Nurse provided contact information. Plan for follow-up call in 5-7 days based on severity of symptoms and risk factors. Plan for next call: CT FU, check sats and resp status.     Kathryn Ardon RN

## 2022-12-06 ENCOUNTER — CARE COORDINATION (OUTPATIENT)
Dept: CASE MANAGEMENT | Age: 37
End: 2022-12-06

## 2022-12-06 NOTE — CARE COORDINATION
[facility] Care Transitions Follow Up Call    Care Transition Nurse attempted subsequent CT outreach leaving Hippa VM to parent/Meredith. Patient: Cecilio Uriarte  Patient : 1985   MRN: <[id number]>  Reason for Admission:  2022 - 2022 [facility]. RSV, Sepsis. Pt has h/o CP, PEG/Colostomy. Discharge Date: 22 RARS: Readmission Risk Score: 13.1  NR  CT    PCP appt  10:30. Attended.     Peter Quinonez RN

## 2022-12-13 ENCOUNTER — CARE COORDINATION (OUTPATIENT)
Dept: CASE MANAGEMENT | Age: 37
End: 2022-12-13

## 2022-12-13 NOTE — CARE COORDINATION
[facility] Care Transitions Follow Up Call    Care Transition Nurse attempted second subsequent CT outreach leaving Hippa ELLE w/ my outreach info. CTN s/o. Patient: Yulia Fong  Patient : 1985   MRN: <[id number]>  Reason for Admission:  2022 - 2022 [facility]. RSV, Sepsis. Pt has h/o CP, PEG/Colostomy. Discharge Date: 22 RARS: Readmission Risk Score: 13.1  NR  CT     PCP appt  10:30. Attended.     Mac Arcos RN

## 2022-12-28 ENCOUNTER — OFFICE VISIT (OUTPATIENT)
Dept: PRIMARY CARE CLINIC | Age: 37
End: 2022-12-28
Payer: MEDICARE

## 2022-12-28 VITALS
BODY MASS INDEX: 18.65 KG/M2 | OXYGEN SATURATION: 98 % | HEART RATE: 64 BPM | HEIGHT: 60 IN | TEMPERATURE: 98.6 F | SYSTOLIC BLOOD PRESSURE: 118 MMHG | WEIGHT: 95 LBS | DIASTOLIC BLOOD PRESSURE: 70 MMHG

## 2022-12-28 DIAGNOSIS — G80.8 OTHER CEREBRAL PALSY (HCC): ICD-10-CM

## 2022-12-28 DIAGNOSIS — J21.0 RSV (ACUTE BRONCHIOLITIS DUE TO RESPIRATORY SYNCYTIAL VIRUS): Primary | ICD-10-CM

## 2022-12-28 DIAGNOSIS — E83.52 HYPERCALCEMIA: ICD-10-CM

## 2022-12-28 DIAGNOSIS — D69.6 THROMBOCYTOPENIA, UNSPECIFIED (HCC): ICD-10-CM

## 2022-12-28 DIAGNOSIS — G40.909 SEIZURE DISORDER (HCC): ICD-10-CM

## 2022-12-28 DIAGNOSIS — F72 SEVERE INTELLECTUAL DISABILITIES: ICD-10-CM

## 2022-12-28 DIAGNOSIS — E87.5 HYPERKALEMIA: ICD-10-CM

## 2022-12-28 DIAGNOSIS — Q43.1 HIRSCHSPRUNG'S DISEASE: ICD-10-CM

## 2022-12-28 DIAGNOSIS — K21.9 GASTROESOPHAGEAL REFLUX DISEASE WITHOUT ESOPHAGITIS: ICD-10-CM

## 2022-12-28 DIAGNOSIS — E83.42 HYPOMAGNESEMIA: ICD-10-CM

## 2022-12-28 PROBLEM — E67.3 HIGH VITAMIN D LEVEL: Status: ACTIVE | Noted: 2021-12-01

## 2022-12-28 PROCEDURE — G8427 DOCREV CUR MEDS BY ELIG CLIN: HCPCS | Performed by: FAMILY MEDICINE

## 2022-12-28 PROCEDURE — G8484 FLU IMMUNIZE NO ADMIN: HCPCS | Performed by: FAMILY MEDICINE

## 2022-12-28 PROCEDURE — 1036F TOBACCO NON-USER: CPT | Performed by: FAMILY MEDICINE

## 2022-12-28 PROCEDURE — G8420 CALC BMI NORM PARAMETERS: HCPCS | Performed by: FAMILY MEDICINE

## 2022-12-28 PROCEDURE — 99215 OFFICE O/P EST HI 40 MIN: CPT | Performed by: FAMILY MEDICINE

## 2022-12-28 RX ORDER — ESOMEPRAZOLE MAGNESIUM 20 MG/1
FOR SUSPENSION ORAL
Qty: 30 PACKET | Refills: 12 | Status: SHIPPED | OUTPATIENT
Start: 2022-12-28

## 2022-12-28 RX ORDER — METRONIDAZOLE 250 MG/1
250 TABLET ORAL 2 TIMES DAILY
Qty: 14 TABLET | Refills: 1 | Status: SHIPPED | OUTPATIENT
Start: 2022-12-28 | End: 2023-01-04

## 2022-12-28 NOTE — PROGRESS NOTES
Comfort Kenyon : 1985 Sex: male  Age: 30 y.o. Chief Complaint   Patient presents with    Discuss Medications    Medication Refill     Patient presents today with mom for annual physical and annual forms. Generally doing well, appears baseline. Develop severe generalized rash/hives after second Pfizer vaccine. Since normalized. Risk of third vaccine reviewed. Contrasted mRNA with Sofia [place], R/B reviewed. Not planning additional covid shots    He was hospitalized with RSV 2021, chest x-ray showed peribronchial cuffing bilateral hilar prominence correlate for acute bronchitis or airway disease, CT scan showed limitations, suggestion of pulmonary infiltrate most conspicuous left lower lobe, ill-defined, small moderate size hiatal hernia. Discussed following up on these, recommendations for pulmonology consult to follow-up, mom defers follow-up now, clinically back to baseline and would like to stay conservative in this regard    In the hospital BMP was overall negative CBC showed low but relatively stable platelet count, RSV detected        Malalignment leg is tailbone and that she feels it is prominent, is never had issues with ulcers    Would like PT/OT evaluation      [facility]  He has no  other acute symptoms. He is   NPO. G-tube feeds only. Follows with [facility] neurology. They monitor valproic acid. No recent seizures No shortness of breath or  wheezing. No fever or chills. He had Pfizer Covid vaccine x2. had hives after second so not planning booster. had Tdap . Had Pneumovax  , counseled on prevnar 13;  had chicken pox. Counseled on flu vaccine as well - declines    On file not take calcium supplement had more D than she thought, nearly toxic level  , trending down last time    Considering prevnar 13 at pharmacy, declines flu vaccine. No longer sees  Dr. James Oliva, gastrointestinal at [facility] any longer.  Saw Cardiology at [facility] 2017, said no Pulmonary stenosis despite being told that his whole life, no cardiac issues, fu prn. Has 6 cans of nutrition per day through the G-tube      Does not seem to have any symptomatology consistent with headaches or acute neurologic deficit, does not portray symptoms of shortness of breath, chest pain, mild chronic cough, no abdominal discomfort, vomiting, no change in bowels, no melena or hematochezia. .  Had some change in urinary habits, difficulty going but it was negatively assessed by Dr. Hiral Hedrick with a negative ultrasound and doing well at this time. No gross hematuria. He has had no issues since. No rash. Past Medical History:  extensive including cerebral palsy, severe mental retardation, nonverbal, history of Hirschsprung's Disease, colostomy, feeding tube, seizure disorder, history of valvular disease,  mild pulmonic stenosis (Neg 2017 but difficult exam), chronic allergic rhinitis, thrombocytopenia,  hypercalcemia, lympho cytosis, macrocytosis.      [address]: Dad  lymphoma/pneumonia 66     Social: living with mom         Most Recent Labs  CBC  Lab Results   Component Value Date/Time    WBC 10.5 2022 04:30 AM    WBC 12.8 2022 12:06 PM    WBC 7.5 2021 10:27 AM    RBC 4.14 2022 04:30 AM    RBC 4.68 2022 12:06 PM    RBC 4.89 2021 10:27 AM    HGB 13.8 2022 04:30 AM    HGB 15.7 2022 12:06 PM    HGB 16.3 2021 10:27 AM    HCT 41.1 2022 04:30 AM    HCT 45.9 2022 12:06 PM    HCT 47.5 2021 10:27 AM    MCV 99.3 2022 04:30 AM    MCV 98.1 2022 12:06 PM    MCV 97.1 2021 10:27 AM    PLT 94 2022 04:30 AM     2022 12:06 PM     2021 10:27 AM      CMP  Lab Results   Component Value Date/Time     2022 04:30 AM     2022 12:06 PM     2021 12:25 PM    K 3.9 2022 04:30 AM    K 4.4 2022 12:06 PM    K 5.1 2021 12:25 PM    K 4.7 08/12/2021 12:34 PM     11/23/2022 04:30 AM     11/22/2022 12:06 PM     11/29/2021 12:25 PM    CO2 24 11/23/2022 04:30 AM    CO2 27 11/22/2022 12:06 PM    CO2 31 11/29/2021 12:25 PM    ANIONGAP 11 11/23/2022 04:30 AM    ANIONGAP 14 11/22/2022 12:06 PM    ANIONGAP 7 11/29/2021 12:25 PM    GLUCOSE 95 11/23/2022 04:30 AM    GLUCOSE 83 11/22/2022 12:06 PM    GLUCOSE 82 11/29/2021 12:25 PM    BUN 8 11/23/2022 04:30 AM    BUN 9 11/22/2022 12:06 PM    BUN 10 11/29/2021 12:25 PM    CREATININE 0.6 11/23/2022 04:30 AM    CREATININE 0.7 11/22/2022 12:06 PM    CREATININE 0.6 11/29/2021 12:25 PM    LABGLOM >60 11/23/2022 04:30 AM    LABGLOM >60 11/22/2022 12:06 PM    LABGLOM >60 11/29/2021 12:25 PM    LABGLOM >60 06/22/2021 10:27 AM    LABGLOM >60 01/25/2020 07:48 AM    GFRAA >60 11/29/2021 12:25 PM    GFRAA >60 06/22/2021 10:27 AM    GFRAA >60 01/25/2020 07:48 AM    CALCIUM 9.7 11/23/2022 04:30 AM    CALCIUM 10.7 11/22/2022 12:06 PM    CALCIUM 10.5 11/29/2021 12:25 PM    PROT 7.8 11/22/2022 12:06 PM    PROT 8.2 06/22/2021 10:27 AM    PROT 8.0 01/25/2020 07:48 AM    LABALBU 3.9 11/22/2022 12:06 PM    LABALBU 4.8 06/22/2021 10:27 AM    LABALBU 4.8 01/25/2020 07:48 AM    BILITOT 0.4 11/22/2022 12:06 PM    BILITOT 0.3 06/22/2021 10:27 AM    BILITOT 0.3 01/25/2020 07:48 AM    ALKPHOS 136 11/22/2022 12:06 PM    ALKPHOS 99 06/22/2021 10:27 AM    ALKPHOS 100 01/25/2020 07:48 AM    AST 61 11/22/2022 12:06 PM    AST 22 06/22/2021 10:27 AM    AST 22 01/25/2020 07:48 AM    ALT 43 11/22/2022 12:06 PM    ALT 14 06/22/2021 10:27 AM    ALT 12 01/25/2020 07:48 AM     A1C  No results found for: LABA1C  TSH  Lab Results   Component Value Date/Time    TSH 1.340 06/22/2021 10:27 AM    TSH 2.380 01/25/2020 07:48 AM    TSH 1.380 08/25/2016 06:02 PM     FREET4  No results found for: Y0MAORY  LIPID  Lab Results   Component Value Date/Time    CHOL 123 06/22/2021 10:27 AM    CHOL 119 01/25/2020 07:48 AM    HDL 49 06/22/2021 10:27 AM HDL 52 01/25/2020 07:48 AM    LDLCALC 62 06/22/2021 10:27 AM    LDLCALC 49 01/25/2020 07:48 AM    TRIG 62 06/22/2021 10:27 AM    TRIG 89 01/25/2020 07:48 AM     VITAMIN D  Lab Results   Component Value Date/Time    VITD25 80 11/29/2021 12:25 PM    VITD25 90 08/12/2021 12:34 PM    VITD25 97 06/22/2021 10:27 AM     MAGNESIUM  Lab Results   Component Value Date/Time    MG 2.0 06/22/2021 10:27 AM      PHOS  No results found for: PHOS   MELLISSA   No results found for: MELLISSA  RHEUMATOID FACTOR  No results found for: RF  PSA  No results found for: PSA   HEPATITIS C  No results found for: HCVABI  HIV  No results found for: KJI6NKW, HIV1QT  UA  Lab Results   Component Value Date/Time    COLORU Yellow 01/09/2019 08:17 PM    CLARITYU Clear 01/09/2019 08:17 PM    GLUCOSEU Negative 01/09/2019 08:17 PM    BILIRUBINUR Negative 01/09/2019 08:17 PM    KETUA Negative 01/09/2019 08:17 PM    SPECGRAV >=1.030 01/09/2019 08:17 PM    BLOODU Negative 01/09/2019 08:17 PM    PHUR 5.5 01/09/2019 08:17 PM    PROTEINU Negative 01/09/2019 08:17 PM    UROBILINOGEN 0.2 01/09/2019 08:17 PM    NITRU Negative 01/09/2019 08:17 PM    LEUKOCYTESUR Negative 01/09/2019 08:17 PM     Urine Micro/Albumin Ratio  No results found for: MALBCR        Current Outpatient Medications:     esomeprazole Magnesium (NEXIUM) 20 MG PACK, 1 per g tube every day, Disp: 30 packet, Rfl: 12    metroNIDAZOLE (FLAGYL) 250 MG tablet, 1 tablet by Per G Tube route 2 times daily for 7 days, Disp: 14 tablet, Rfl: 1    Handicap Placard MISC, by Does not apply route Duration 5 years, Disp: 1 each, Rfl: 0    Incontinence Supply Disposable ([employer] FITTED BRIEFS DAY/NIGHT MD) MISC, USE AS DIRECTED, Disp: 200 each, Rfl: 12    Incontinence Supply Disposable ([employer] DAY & NIGHT UNDERPADS) MISC, 1 Package by Does not apply route daily, Disp: 200 each, Rfl: 12    Ostomy Supplies (STOMAHESIVE PROTECTIVE) POWD, by Other route Apply to affected area of stoma, Disp: , Rfl:     Zinc Oxide (BALMEX EX), Apply topically Apply to Natanael site, Disp: , Rfl:     Hydrocortisone (PREPARATION H EX), Apply topically Twice a day as needed, Disp: , Rfl:     Oral Electrolytes (PEDIALYTE PO), Take by mouth prn, Disp: , Rfl:     Nutritional Supplements (ENSURE PO), Take by mouth 6 cans a day, Disp: , Rfl:     loratadine 5 MG/5ML solution, Take 5 mg by mouth daily 10 ml GT QD, Disp: , Rfl:     valproate (DEPAKENE) 250 MG/5ML solution, Take by mouth 3 times daily 5.0 ml tid, Disp: , Rfl:     ethosuximide (ZARONTIN) 250 MG/5ML solution, Take 7.5 mLs by mouth 2 times daily, Disp: , Rfl:   Allergies   Allergen Reactions    Lactose     Lactose Intolerance (Gi)     Other     Peg-2000 (Covid-19 Mrna Vaccine Component)      Rash generalized after second phizer vaccine    Seasonal        Past Medical History:   Diagnosis Date    Cerebral palsy ([facility] [address]     Hirschsprung disease     Hypospadias     Mental retardation     Pulmonic stenosis      Past Surgical History:   Procedure Laterality Date    COLOSTOMY      FOOT SURGERY      GASTRIC FUNDOPLICATION      GASTROSTOMY TUBE PLACEMENT      PATENT DUCTUS ARTERIOUS LIGATION      RECTAL SURGERY      anal pull through x 2 for Hirschsprung's - then colostomy x 2    TESTICLE REMOVAL       No family history on file.   Social History     Tobacco Use    Smoking status: Never    Smokeless tobacco: Never   Vaping Use    Vaping Use: Never used   Substance Use Topics    Alcohol use: No    Drug use: No      Social History     Social History Narrative    PMH:    Problem List: Infantile cerebral palsy, Congenital heart disease, Thrombocytopenic disorder, Seizure,    Severe mental retardation (I.Q. 20-34)         cerebral palsy, profound mental retardation, nonverbal,    history of Hirschsprung's Disease, colostomy, feeding tube, seizure disorder, history of valvular disease,    mild pulmonic stenosis (recent neg but difficult exam), chronic allergic rhinitis, thrombocytopenia,    hypercalcemia, lympho cytosis, macrocytosis. [address]: Dad  lymphoma/pneumonia 66        SH:    . (Marital)reviewed    Personal Habits: Cigarette Use: Nonsmoker. . (Alcohol)        Vitals:    22 1342   BP: 118/70   Pulse: 64   Temp: 98.6 °F (37 °C)   SpO2: 98%   Weight: 95 lb (43.1 kg)   Height: 5' (1.524 m)      Wt Readings from Last 3 Encounters:   22 95 lb (43.1 kg)   22 96 lb 1.6 oz (43.6 kg)   22 95 lb (43.1 kg)            Exam:  Const: Appears comfortable. No signs of acute distress present. Alert  Head/Face: Atraumatic on inspection. Eyes: No discharge from the eyes. Sclerae clear. ENMT: Ears clear nose clear rhinorrhea oropharynx MMM  Neck: Supple. Palpation reveals no adenopathy. No masses appreciated. No JVD. Resp: Respirations are unlabored. Clear to auscultation. No rales, rhonchi or wheezes  appreciated over the lungs bilaterally. CV: Rate is regular. Rhythm is regular. Extremities: No clubbing or cyanosis. No edema of the lower limbs bilaterally. No calf  inflammation or tenderness. Abdomen: Bowel sounds are normoactive. Abdomen is soft, nontender, and nondistended. No  abdominal masses. No palpable hepatosplenomegaly. G-tube without surrounding irritation. Skin: Dry and warm with no rash. Neuro: without acute change. Braces both legs. Nonverbal.  Back-chronic curvature, although tailbone prominent, no clinical suspicious findings. No ulceration. Assessment and Plan:   Diagnosis Orders   1. RSV (acute bronchiolitis due to respiratory syncytial virus)        2. Gastroesophageal reflux disease without esophagitis  esomeprazole Magnesium (NEXIUM) 20 MG PACK      3. Seizure disorder (HCC)        4. Other cerebral palsy (HCC)  Handicap Placard [facility]    PT home eval    OT home evaluation      5. Hypercalcemia        6. Thrombocytopenia, unspecified ([address]        7. Hyperkalemia        8. Hirschsprung's disease        9. Severe intellectual disabilities        10.  Hypomagnesemia             Severe intellectual disabilities    chronic, getting excellent care    Gastroesophageal reflux disease without esophagitis    chronic, well controlled With use of Nexium through G-tube. R/B reviewed. Defers follow-up with GI unless symptoms. Refilled Nexium    Hypercalcemia  Trended down after stopping supplement, last check normal    Elevated vitamin D  Was last trended down, asymptomatic, await repeat      Hyperkalemia    chronically fluctuated. Hemolysis may play a role. Asymptomatic. Monitor. Last check normal      Other cerebral palsy ([facility] [address]  Counseled. Risk of complications reviewed. Care appears to be excellent. Forms completed today, 12/28/2022. Ordered home PT/OT    Hirschsprung's disease  He should continue per GI. Gets occasional foul-smelling diarrhea chronically which Flagyl resolves. Would like a refill have on hand     Pulmonary infundibular stenosis  Pulmonary stenosis. Continue per cardiology. Last echo apparently did not show it,  but mom not sure that they got a clear picture. Thrombocytopenia, unspecified ([address]  Counseled extensively. Differential reviewed, including serious etiologies. Stable     Seizure disorder ([address]  Continue per neurology. Mom defers levels to them. Tolerating medications. ADRs interactions reviewed. Seizure precautions. Other dysphagia  Is strict NPO. Has G-tube     Health maintenance examination  Counseled as above, 12/20/2022 encourage yearly Counseled on vaccines. Rash  Chronic intermittent irritation around the G-tube but does very well with compound cream through: Pharmacy including Maalox/Aquaphor/zinc oxide/nystatin/0.5% hydrocortisone, not for long-term use, may use 1 to 2 weeks on/1 week off as needed    Tartar buildup  Gets cleanings under anesthesia-counseled      Urinary retention   back to baseline. Continue per Dr. Bill Pratt.   Had renal ultrasound    Macrocytosis  Subsequently normalized  , Vitamin B12/folic acid actually high    Hypomagnesemia  On supplement. Monitor    Recent RSV  Clinically resolved. Chest x-ray/CT findings as above, defers follow-up now. No flowsheet data found. Plan as above. Counseled extensively and differential diagnoses relevant to above were reviewed, including serious etiologies, risks and complications, especially of left uncontrolled. If relevant, instructions and  alternatives to meds/treatment reviewed, as well as interactions, and  SE's/ADRs reviewed, notify immediately if any, discontinuing new meds if any. Plan made after discussion and shared decision making. Counseled extensively. Forms completed. Prog will remain guarded given multiple comorbities but excellent care being provided by  caretakers. Continue per multiple specialists. appears baseline health overall. At this point simply wants blood work as ordered and follow-up for 1 year physical sooner if needed. Precautions reviewed. Home PT/OT ordered. Refills provided. Handicap placard given. Over 40  minutes spent with the patient in reviewing records, reviewing with patient/family, counseling,  ordering, prescribing, completing h&p, etc., with over 50% of the time spent face to face  counseling. Signs and symptoms to watch for discussed. Serious signs and symptoms reviewed. ER if any. As long as symptoms steadily improve/resolve, and medical conditions follow the expected course, FU as below, sooner PRN. Return in about 1 year (around 12/28/2023), or if symptoms worsen or fail to improve, for physical.         Signs and symptoms to watch for discussed, serious signs and symptoms reviewed. ER if any. Kasandra Juarez MD    Patients are advised to check with insurance company to ensure coverage and to fully understand benefits and cost prior to any testing. This note was created with the assistance of voice recognition software.   Document was reviewed however may contain grammatical

## 2022-12-29 ENCOUNTER — TELEPHONE (OUTPATIENT)
Dept: PRIMARY CARE CLINIC | Age: 37
End: 2022-12-29

## 2022-12-29 NOTE — TELEPHONE ENCOUNTER
Spoke with Kusum Willett will reach out to insurance company to see which home PT and OT will be covered.

## 2023-01-30 RX ORDER — LACTOSE-REDUCED FOOD
LIQUID (ML) ORAL
Qty: 540 EACH | Refills: 3 | Status: SHIPPED
Start: 2023-01-30 | End: 2023-02-01 | Stop reason: SDUPTHER

## 2023-01-30 NOTE — TELEPHONE ENCOUNTER
Ensure Shortage. Darrell Wayne told mom they can substitute with Boost, but would need a new prescription. 6 cans per day    [facility] Fax.  [phone]  Please call mom once new script is sent over

## 2023-01-31 NOTE — TELEPHONE ENCOUNTER
It looks like medication was sent to [facility] but it needs to be faxed to [facility]

## 2023-02-01 RX ORDER — LACTOSE-REDUCED FOOD
LIQUID (ML) ORAL
Qty: 540 EACH | Refills: 3 | Status: SHIPPED | OUTPATIENT
Start: 2023-02-01

## 2023-02-15 DIAGNOSIS — K21.9 GASTROESOPHAGEAL REFLUX DISEASE WITHOUT ESOPHAGITIS: ICD-10-CM

## 2023-02-15 RX ORDER — ESOMEPRAZOLE MAGNESIUM 20 MG/1
FOR SUSPENSION ORAL
Qty: 30 PACKET | Refills: 12 | Status: SHIPPED | OUTPATIENT
Start: 2023-02-15

## 2023-04-05 ENCOUNTER — OFFICE VISIT (OUTPATIENT)
Dept: PRIMARY CARE CLINIC | Age: 38
End: 2023-04-05

## 2023-04-05 VITALS
TEMPERATURE: 97.9 F | WEIGHT: 95 LBS | SYSTOLIC BLOOD PRESSURE: 118 MMHG | BODY MASS INDEX: 18.55 KG/M2 | DIASTOLIC BLOOD PRESSURE: 62 MMHG

## 2023-04-05 DIAGNOSIS — G40.909 SEIZURE DISORDER (HCC): ICD-10-CM

## 2023-04-05 DIAGNOSIS — T45.2X1A POISONING BY VITAMIN D, ACCIDENTAL OR UNINTENTIONAL, INITIAL ENCOUNTER: ICD-10-CM

## 2023-04-05 DIAGNOSIS — R19.5 OTHER FECAL ABNORMALITIES: ICD-10-CM

## 2023-04-05 DIAGNOSIS — K21.9 GASTROESOPHAGEAL REFLUX DISEASE WITHOUT ESOPHAGITIS: ICD-10-CM

## 2023-04-05 DIAGNOSIS — F72 SEVERE INTELLECTUAL DISABILITIES: ICD-10-CM

## 2023-04-05 DIAGNOSIS — G80.8 OTHER CEREBRAL PALSY (HCC): ICD-10-CM

## 2023-04-05 DIAGNOSIS — Q43.1 HIRSCHSPRUNG'S DISEASE: Primary | ICD-10-CM

## 2023-04-05 DIAGNOSIS — D75.89 MACROCYTOSIS: ICD-10-CM

## 2023-04-05 DIAGNOSIS — E87.5 HYPERKALEMIA: ICD-10-CM

## 2023-04-05 DIAGNOSIS — Z93.1 S/P NISSEN FUNDOPLICATION (WITH GASTROSTOMY TUBE PLACEMENT) (HCC): ICD-10-CM

## 2023-04-05 DIAGNOSIS — D69.6 THROMBOCYTOPENIA, UNSPECIFIED (HCC): ICD-10-CM

## 2023-04-05 DIAGNOSIS — Q43.1 HIRSCHSPRUNG'S DISEASE: ICD-10-CM

## 2023-04-05 DIAGNOSIS — E83.52 HYPERCALCEMIA: ICD-10-CM

## 2023-04-05 LAB
ALBUMIN SERPL-MCNC: 4.6 G/DL (ref 3.5–5.2)
ALP SERPL-CCNC: 113 U/L (ref 40–129)
ALT SERPL-CCNC: 13 U/L (ref 0–40)
ANION GAP SERPL CALCULATED.3IONS-SCNC: 14 MMOL/L (ref 7–16)
AST SERPL-CCNC: 27 U/L (ref 0–39)
BASOPHILS # BLD: 0.04 E9/L (ref 0–0.2)
BASOPHILS NFR BLD: 0.5 % (ref 0–2)
BILIRUB SERPL-MCNC: 0.3 MG/DL (ref 0–1.2)
BUN SERPL-MCNC: 9 MG/DL (ref 6–20)
CALCIUM SERPL-MCNC: 10.6 MG/DL (ref 8.6–10.2)
CHLORIDE SERPL-SCNC: 102 MMOL/L (ref 98–107)
CO2 SERPL-SCNC: 26 MMOL/L (ref 22–29)
CREAT SERPL-MCNC: 0.6 MG/DL (ref 0.7–1.2)
EOSINOPHIL # BLD: 0.04 E9/L (ref 0.05–0.5)
EOSINOPHIL NFR BLD: 0.5 % (ref 0–6)
ERYTHROCYTE [DISTWIDTH] IN BLOOD BY AUTOMATED COUNT: 12.9 FL (ref 11.5–15)
GLUCOSE SERPL-MCNC: 82 MG/DL (ref 74–99)
HCT VFR BLD AUTO: 47.9 % (ref 37–54)
HGB BLD-MCNC: 15.4 G/DL (ref 12.5–16.5)
IMM GRANULOCYTES # BLD: 0.02 E9/L
IMM GRANULOCYTES NFR BLD: 0.2 % (ref 0–5)
LIPASE: 46 U/L (ref 13–60)
LYMPHOCYTES # BLD: 2.39 E9/L (ref 1.5–4)
LYMPHOCYTES NFR BLD: 29.4 % (ref 20–42)
MAGNESIUM SERPL-MCNC: 2.2 MG/DL (ref 1.6–2.6)
MCH RBC QN AUTO: 33 PG (ref 26–35)
MCHC RBC AUTO-ENTMCNC: 32.2 % (ref 32–34.5)
MCV RBC AUTO: 102.8 FL (ref 80–99.9)
MONOCYTES # BLD: 1.06 E9/L (ref 0.1–0.95)
MONOCYTES NFR BLD: 13 % (ref 2–12)
NEUTROPHILS # BLD: 4.59 E9/L (ref 1.8–7.3)
NEUTS SEG NFR BLD: 56.4 % (ref 43–80)
PLATELET # BLD AUTO: 124 E9/L (ref 130–450)
PMV BLD AUTO: 15.2 FL (ref 7–12)
POTASSIUM SERPL-SCNC: 4.8 MMOL/L (ref 3.5–5)
PROT SERPL-MCNC: 8 G/DL (ref 6.4–8.3)
RBC # BLD AUTO: 4.66 E12/L (ref 3.8–5.8)
SODIUM SERPL-SCNC: 142 MMOL/L (ref 132–146)
TSH SERPL-MCNC: 1.04 UIU/ML (ref 0.27–4.2)
VITAMIN D 25-HYDROXY: 69 NG/ML (ref 30–100)
WBC # BLD: 8.1 E9/L (ref 4.5–11.5)

## 2023-04-05 RX ORDER — METRONIDAZOLE 500 MG/1
500 TABLET ORAL 2 TIMES DAILY
Qty: 20 TABLET | Refills: 1 | Status: SHIPPED | OUTPATIENT
Start: 2023-04-05 | End: 2023-04-15

## 2023-04-05 SDOH — ECONOMIC STABILITY: INCOME INSECURITY: HOW HARD IS IT FOR YOU TO PAY FOR THE VERY BASICS LIKE FOOD, HOUSING, MEDICAL CARE, AND HEATING?: NOT HARD AT ALL

## 2023-04-05 SDOH — ECONOMIC STABILITY: HOUSING INSECURITY
IN THE LAST 12 MONTHS, WAS THERE A TIME WHEN YOU DID NOT HAVE A STEADY PLACE TO SLEEP OR SLEPT IN A SHELTER (INCLUDING NOW)?: NO

## 2023-04-05 SDOH — ECONOMIC STABILITY: FOOD INSECURITY: WITHIN THE PAST 12 MONTHS, THE FOOD YOU BOUGHT JUST DIDN'T LAST AND YOU DIDN'T HAVE MONEY TO GET MORE.: NEVER TRUE

## 2023-04-05 SDOH — ECONOMIC STABILITY: FOOD INSECURITY: WITHIN THE PAST 12 MONTHS, YOU WORRIED THAT YOUR FOOD WOULD RUN OUT BEFORE YOU GOT MONEY TO BUY MORE.: NEVER TRUE

## 2023-04-05 ASSESSMENT — PATIENT HEALTH QUESTIONNAIRE - PHQ9: DEPRESSION UNABLE TO ASSESS: FUNCTIONAL CAPACITY MOTIVATION LIMITS ACCURACY

## 2023-04-05 NOTE — PROGRESS NOTES
apparently did not show it,  but mom not sure that they got a clear picture. Thrombocytopenia, unspecified ([facility] [address]  Counseled extensively. Differential reviewed, including serious etiologies. Stable     Seizure disorder ([facility] [address]  Continue per neurology. Mom defers levels to them. Tolerating medications. ADRs interactions reviewed. Seizure precautions. Other dysphagia  Is strict NPO. Has G-tube     Health maintenance examination  Counseled as above, 12/20/2022 encourage yearly Counseled on vaccines. Rash  Chronic intermittent irritation around the G-tube but does very well with compound cream through: Pharmacy including Maalox/Aquaphor/zinc oxide/nystatin/0.5% hydrocortisone, not for long-term use, may use 1 to 2 weeks on/1 week off as needed    Tartar buildup  Gets cleanings under anesthesia-counseled      Urinary retention   back to baseline. Continue per Dr. Srinivasa Galvez. Had renal ultrasound    Macrocytosis  Subsequently normalized  , Vitamin B12/folic acid actually high    Hypomagnesemia  On supplement. Monitor    Recent RSV  Clinically resolved. Chest x-ray/CT findings as above, defers follow-up now. No flowsheet data found. Plan as above. Counseled extensively and differential diagnoses relevant to above were reviewed, including serious etiologies, risks and complications, especially of left uncontrolled. If relevant, instructions and  alternatives to meds/treatment reviewed, as well as interactions, and  SE's/ADRs reviewed, notify immediately if any, discontinuing new meds if any. Plan made after discussion and shared decision making. He appears baseline, exam baseline, no evidence of systemic findings. Refer Dr. Shital Orozco. Check blood work and stool cultures. Flagyl as above.   Otherwise plan to keep April 26 follow-up with me sooner as needed             As long as symptoms steadily improve/resolve, and medical conditions follow the expected course, FU as below, sooner

## 2023-04-06 DIAGNOSIS — D75.89 MACROCYTOSIS: Primary | ICD-10-CM

## 2023-04-06 LAB
C DIFF TOXIN/ANTIGEN: NORMAL
G LAMBLIA AG STL QL IA: NORMAL
WBC #/AREA STL HPF: NORMAL /[HPF]

## 2023-04-09 LAB — BACTERIA STL CULT: NORMAL

## 2023-04-26 ENCOUNTER — OFFICE VISIT (OUTPATIENT)
Dept: PRIMARY CARE CLINIC | Age: 38
End: 2023-04-26

## 2023-04-26 VITALS
WEIGHT: 95 LBS | BODY MASS INDEX: 18.65 KG/M2 | SYSTOLIC BLOOD PRESSURE: 116 MMHG | HEART RATE: 79 BPM | TEMPERATURE: 97.8 F | DIASTOLIC BLOOD PRESSURE: 62 MMHG | HEIGHT: 60 IN

## 2023-04-26 DIAGNOSIS — F72 SEVERE INTELLECTUAL DISABILITIES: ICD-10-CM

## 2023-04-26 DIAGNOSIS — K03.6 TARTAR DEPOSITS ON TEETH: Primary | ICD-10-CM

## 2023-04-26 DIAGNOSIS — E83.52 HYPERCALCEMIA: ICD-10-CM

## 2023-04-26 DIAGNOSIS — K21.9 GASTROESOPHAGEAL REFLUX DISEASE WITHOUT ESOPHAGITIS: ICD-10-CM

## 2023-04-26 DIAGNOSIS — D75.89 MACROCYTOSIS: ICD-10-CM

## 2023-04-26 DIAGNOSIS — G80.8 OTHER CEREBRAL PALSY (HCC): ICD-10-CM

## 2023-04-26 DIAGNOSIS — D69.6 THROMBOCYTOPENIA, UNSPECIFIED (HCC): ICD-10-CM

## 2023-04-26 DIAGNOSIS — E55.9 VITAMIN D DEFICIENCY: ICD-10-CM

## 2023-04-26 DIAGNOSIS — Q43.1 HIRSCHSPRUNG'S DISEASE: ICD-10-CM

## 2023-04-26 DIAGNOSIS — G40.909 SEIZURE DISORDER (HCC): ICD-10-CM

## 2023-04-26 DIAGNOSIS — Z93.1 S/P NISSEN FUNDOPLICATION (WITH GASTROSTOMY TUBE PLACEMENT) (HCC): ICD-10-CM

## 2023-04-26 ASSESSMENT — PATIENT HEALTH QUESTIONNAIRE - PHQ9: DEPRESSION UNABLE TO ASSESS: FUNCTIONAL CAPACITY MOTIVATION LIMITS ACCURACY

## 2023-04-26 NOTE — PROGRESS NOTES
Has 6 cans of nutrition per day through the G-tube. Feeds have changed based on shortage      Does not seem to have any symptomatology consistent with headaches or acute neurologic deficit, does not portray symptoms of shortness of breath, chest pain,, cough, no abdominal discomfort, vomiting, no change in bowels (outside of foul-smelling stool), no melena or hematochezia. Justine Segura No change in urinary habits. Was previously assessed by Dr. Bettye Marmolejo with a negative ultrasound and doing well at this time. No gross hematuria. He has had no issues since. No rash. Past Medical History:  extensive including cerebral palsy, severe mental retardation, nonverbal, history of Hirschsprung's Disease, colostomy, feeding tube after Nissen fundoplication for GERD 10months of age, seizure disorder, history of valvular disease,  mild pulmonic stenosis (Neg 2017 but difficult exam), chronic allergic rhinitis, thrombocytopenia,  hypercalcemia, lympho cytosis, macrocytosis.      [address]: Dad  lymphoma/pneumonia 66     Social: living with mom         Most Recent Labs  CBC  Lab Results   Component Value Date/Time    WBC 8.1 2023 04:10 PM    WBC 10.5 2022 04:30 AM    WBC 12.8 2022 12:06 PM    RBC 4.66 2023 04:10 PM    RBC 4.14 2022 04:30 AM    RBC 4.68 2022 12:06 PM    HGB 15.4 2023 04:10 PM    HGB 13.8 2022 04:30 AM    HGB 15.7 2022 12:06 PM    HCT 47.9 2023 04:10 PM    HCT 41.1 2022 04:30 AM    HCT 45.9 2022 12:06 PM    .8 2023 04:10 PM    MCV 99.3 2022 04:30 AM    MCV 98.1 2022 12:06 PM     2023 04:10 PM    PLT 94 2022 04:30 AM     2022 12:06 PM      CMP  Lab Results   Component Value Date/Time     2023 04:10 PM     2022 04:30 AM     2022 12:06 PM    K 4.8 2023 04:10 PM    K 3.9 2022 04:30 AM    K 4.4 2022 12:06 PM    K 5.1 2021 12:25 PM

## 2023-05-27 ENCOUNTER — HOSPITAL ENCOUNTER (OUTPATIENT)
Age: 38
Discharge: HOME | End: 2023-05-27
Payer: MEDICARE

## 2023-05-27 DIAGNOSIS — E83.52 HYPERCALCEMIA: ICD-10-CM

## 2023-05-27 DIAGNOSIS — D75.89 MACROCYTOSIS: ICD-10-CM

## 2023-05-27 LAB
CALCIUM SERPL-MCNC: 10.4 MG/DL (ref 8.6–10.2)
FOLATE SERPL-MCNC: >20 NG/ML (ref 4.8–24.2)
PTH-INTACT SERPL-MCNC: 18 PG/ML (ref 15–65)
VIT B12 SERPL-MCNC: 1953 PG/ML (ref 211–946)

## 2023-05-27 PROCEDURE — 36415 COLL VENOUS BLD VENIPUNCTURE: CPT

## 2023-05-27 PROCEDURE — 82310 ASSAY OF CALCIUM: CPT

## 2023-05-27 PROCEDURE — 82746 ASSAY OF FOLIC ACID SERUM: CPT

## 2023-05-27 PROCEDURE — 83970 ASSAY OF PARATHORMONE: CPT

## 2023-05-27 PROCEDURE — 82607 VITAMIN B-12: CPT

## 2023-07-06 ENCOUNTER — HOSPITAL ENCOUNTER (EMERGENCY)
Age: 38
Discharge: HOME | End: 2023-07-07
Attending: EMERGENCY MEDICINE
Payer: MEDICARE

## 2023-07-06 ENCOUNTER — HOSPITAL ENCOUNTER (EMERGENCY)
Age: 38
Discharge: LEFT BEFORE DISCHARGE | End: 2023-07-06

## 2023-07-06 ENCOUNTER — APPOINTMENT (OUTPATIENT)
Dept: CT IMAGING | Age: 38
End: 2023-07-06
Payer: MEDICARE

## 2023-07-06 VITALS
WEIGHT: 95 LBS | HEIGHT: 60 IN | RESPIRATION RATE: 16 BRPM | TEMPERATURE: 97.9 F | HEART RATE: 98 BPM | DIASTOLIC BLOOD PRESSURE: 78 MMHG | SYSTOLIC BLOOD PRESSURE: 122 MMHG | OXYGEN SATURATION: 96 % | BODY MASS INDEX: 18.65 KG/M2

## 2023-07-06 VITALS
OXYGEN SATURATION: 96 % | WEIGHT: 95 LBS | TEMPERATURE: 97.8 F | HEIGHT: 60 IN | HEART RATE: 96 BPM | BODY MASS INDEX: 18.65 KG/M2 | RESPIRATION RATE: 20 BRPM | SYSTOLIC BLOOD PRESSURE: 126 MMHG | DIASTOLIC BLOOD PRESSURE: 74 MMHG

## 2023-07-06 DIAGNOSIS — R10.30 LOWER ABDOMINAL PAIN: ICD-10-CM

## 2023-07-06 DIAGNOSIS — J06.9 VIRAL URI: Primary | ICD-10-CM

## 2023-07-06 DIAGNOSIS — R10.13 DYSPEPSIA: ICD-10-CM

## 2023-07-06 LAB
ALBUMIN SERPL-MCNC: 4.7 G/DL (ref 3.5–5.2)
ALP SERPL-CCNC: 117 U/L (ref 40–129)
ALT SERPL-CCNC: 10 U/L (ref 0–40)
ANION GAP SERPL CALCULATED.3IONS-SCNC: 12 MMOL/L (ref 7–16)
AST SERPL-CCNC: 21 U/L (ref 0–39)
BASOPHILS # BLD: 0.04 E9/L (ref 0–0.2)
BASOPHILS NFR BLD: 0.3 % (ref 0–2)
BILIRUB SERPL-MCNC: 0.3 MG/DL (ref 0–1.2)
BILIRUB UR QL STRIP: NEGATIVE
BUN SERPL-MCNC: 11 MG/DL (ref 6–20)
CALCIUM SERPL-MCNC: 10.4 MG/DL (ref 8.6–10.2)
CHLORIDE SERPL-SCNC: 98 MMOL/L (ref 98–107)
CLARITY UR: CLEAR
CO2 SERPL-SCNC: 28 MMOL/L (ref 22–29)
COLOR UR: YELLOW
CREAT SERPL-MCNC: 0.6 MG/DL (ref 0.7–1.2)
EOSINOPHIL # BLD: 0 E9/L (ref 0.05–0.5)
EOSINOPHIL NFR BLD: 0 % (ref 0–6)
ERYTHROCYTE [DISTWIDTH] IN BLOOD BY AUTOMATED COUNT: 12.2 FL (ref 11.5–15)
GLUCOSE SERPL-MCNC: 91 MG/DL (ref 74–99)
GLUCOSE UR STRIP-MCNC: NEGATIVE MG/DL
HCT VFR BLD AUTO: 44.2 % (ref 37–54)
HGB BLD-MCNC: 15.5 G/DL (ref 12.5–16.5)
HGB UR QL STRIP: NEGATIVE
IMM GRANULOCYTES # BLD: 0.06 E9/L
IMM GRANULOCYTES NFR BLD: 0.4 % (ref 0–5)
INFLUENZA A: NOT DETECTED
INFLUENZA B: NOT DETECTED
KETONES UR STRIP-MCNC: NEGATIVE MG/DL
LACTATE BLDV-SCNC: 1.8 MMOL/L (ref 0.5–2.2)
LEUKOCYTE ESTERASE UR QL STRIP: NEGATIVE
LIPASE: 46 U/L (ref 13–60)
LYMPHOCYTES # BLD: 2.75 E9/L (ref 1.5–4)
LYMPHOCYTES NFR BLD: 20 % (ref 20–42)
MCH RBC QN AUTO: 33.5 PG (ref 26–35)
MCHC RBC AUTO-ENTMCNC: 35.1 % (ref 32–34.5)
MCV RBC AUTO: 95.7 FL (ref 80–99.9)
MONOCYTES # BLD: 1.1 E9/L (ref 0.1–0.95)
MONOCYTES NFR BLD: 8 % (ref 2–12)
NEUTROPHILS # BLD: 9.78 E9/L (ref 1.8–7.3)
NEUTS SEG NFR BLD: 71.3 % (ref 43–80)
NITRITE UR QL STRIP: NEGATIVE
PH UR STRIP: 7.5 [PH] (ref 5–9)
PLATELET # BLD AUTO: 163 E9/L (ref 130–450)
PMV BLD AUTO: 12.6 FL (ref 7–12)
POTASSIUM SERPL-SCNC: 4 MMOL/L (ref 3.5–5)
PROT SERPL-MCNC: 8.4 G/DL (ref 6.4–8.3)
PROT UR STRIP-MCNC: NEGATIVE MG/DL
RBC # BLD AUTO: 4.62 E12/L (ref 3.8–5.8)
SARS-COV-2 RNA, RT PCR: NOT DETECTED
SODIUM SERPL-SCNC: 138 MMOL/L (ref 132–146)
SP GR UR STRIP: 1.01 (ref 1–1.03)
UROBILINOGEN UR STRIP-ACNC: 0.2 E.U./DL
WBC # BLD: 13.7 E9/L (ref 4.5–11.5)

## 2023-07-06 PROCEDURE — 96375 TX/PRO/DX INJ NEW DRUG ADDON: CPT

## 2023-07-06 PROCEDURE — 96376 TX/PRO/DX INJ SAME DRUG ADON: CPT

## 2023-07-06 PROCEDURE — 85025 COMPLETE CBC W/AUTO DIFF WBC: CPT

## 2023-07-06 PROCEDURE — 99284 EMERGENCY DEPT VISIT MOD MDM: CPT

## 2023-07-06 PROCEDURE — 81003 URINALYSIS AUTO W/O SCOPE: CPT

## 2023-07-06 PROCEDURE — A4216 STERILE WATER/SALINE, 10 ML: HCPCS | Performed by: EMERGENCY MEDICINE

## 2023-07-06 PROCEDURE — 83605 ASSAY OF LACTIC ACID: CPT

## 2023-07-06 PROCEDURE — 36415 COLL VENOUS BLD VENIPUNCTURE: CPT

## 2023-07-06 PROCEDURE — 2500000003 HC RX 250 WO HCPCS: Performed by: EMERGENCY MEDICINE

## 2023-07-06 PROCEDURE — 74176 CT ABD & PELVIS W/O CONTRAST: CPT

## 2023-07-06 PROCEDURE — 2580000003 HC RX 258: Performed by: EMERGENCY MEDICINE

## 2023-07-06 PROCEDURE — 6360000002 HC RX W HCPCS: Performed by: EMERGENCY MEDICINE

## 2023-07-06 PROCEDURE — 83690 ASSAY OF LIPASE: CPT

## 2023-07-06 PROCEDURE — 96374 THER/PROPH/DIAG INJ IV PUSH: CPT

## 2023-07-06 PROCEDURE — 87088 URINE BACTERIA CULTURE: CPT

## 2023-07-06 PROCEDURE — 87636 SARSCOV2 & INF A&B AMP PRB: CPT

## 2023-07-06 PROCEDURE — 80053 COMPREHEN METABOLIC PANEL: CPT

## 2023-07-06 RX ORDER — 0.9 % SODIUM CHLORIDE 0.9 %
1300 INTRAVENOUS SOLUTION INTRAVENOUS ONCE
Status: DISCONTINUED | OUTPATIENT
Start: 2023-07-06 | End: 2023-07-06

## 2023-07-06 RX ORDER — LORAZEPAM 2 MG/ML
0.5 INJECTION INTRAMUSCULAR ONCE
Status: COMPLETED | OUTPATIENT
Start: 2023-07-06 | End: 2023-07-06

## 2023-07-06 RX ORDER — LORAZEPAM 2 MG/ML
1 INJECTION INTRAMUSCULAR
Status: DISCONTINUED | OUTPATIENT
Start: 2023-07-06 | End: 2023-07-06

## 2023-07-06 RX ORDER — DIPHENHYDRAMINE HYDROCHLORIDE 50 MG/ML
25 INJECTION INTRAMUSCULAR; INTRAVENOUS ONCE
Status: COMPLETED | OUTPATIENT
Start: 2023-07-06 | End: 2023-07-06

## 2023-07-06 RX ORDER — 0.9 % SODIUM CHLORIDE 0.9 %
1300 INTRAVENOUS SOLUTION INTRAVENOUS ONCE
Status: COMPLETED | OUTPATIENT
Start: 2023-07-06 | End: 2023-07-07

## 2023-07-06 RX ORDER — LORAZEPAM 2 MG/ML
1 INJECTION INTRAMUSCULAR ONCE
Status: COMPLETED | OUTPATIENT
Start: 2023-07-06 | End: 2023-07-06

## 2023-07-06 RX ORDER — KETOROLAC TROMETHAMINE 30 MG/ML
15 INJECTION, SOLUTION INTRAMUSCULAR; INTRAVENOUS ONCE
Status: COMPLETED | OUTPATIENT
Start: 2023-07-06 | End: 2023-07-06

## 2023-07-06 RX ORDER — DIPHENHYDRAMINE HYDROCHLORIDE 50 MG/ML
50 INJECTION INTRAMUSCULAR; INTRAVENOUS
Status: DISCONTINUED | OUTPATIENT
Start: 2023-07-06 | End: 2023-07-06

## 2023-07-06 RX ADMIN — SODIUM CHLORIDE 1300 ML: 9 INJECTION, SOLUTION INTRAVENOUS at 22:12

## 2023-07-06 RX ADMIN — LORAZEPAM 0.5 MG: 2 INJECTION INTRAMUSCULAR; INTRAVENOUS at 21:19

## 2023-07-06 RX ADMIN — LORAZEPAM 1 MG: 2 INJECTION INTRAMUSCULAR; INTRAVENOUS at 22:32

## 2023-07-06 RX ADMIN — DIPHENHYDRAMINE HYDROCHLORIDE 25 MG: 50 INJECTION, SOLUTION INTRAMUSCULAR; INTRAVENOUS at 22:32

## 2023-07-06 RX ADMIN — KETOROLAC TROMETHAMINE 15 MG: 30 INJECTION, SOLUTION INTRAMUSCULAR; INTRAVENOUS at 21:19

## 2023-07-06 RX ADMIN — FAMOTIDINE 20 MG: 10 INJECTION INTRAVENOUS at 21:20

## 2023-07-07 ENCOUNTER — TELEPHONE (OUTPATIENT)
Dept: PRIMARY CARE CLINIC | Age: 38
End: 2023-07-07

## 2023-07-07 RX ORDER — TIZANIDINE HYDROCHLORIDE 2 MG/1
CAPSULE, GELATIN COATED ORAL
Qty: 20 CAPSULE | Refills: 0 | Status: SHIPPED | OUTPATIENT
Start: 2023-07-07

## 2023-07-07 ASSESSMENT — PAIN SCALES - WONG BAKER: WONGBAKER_NUMERICALRESPONSE: 0

## 2023-07-07 ASSESSMENT — PAIN - FUNCTIONAL ASSESSMENT: PAIN_FUNCTIONAL_ASSESSMENT: WONG-BAKER FACES

## 2023-07-07 NOTE — DISCHARGE INSTRUCTIONS
NOTE:  Call Rivas Dominguez primary care provider () around this morning, to let him know Sreekanth's progress. Get immediate medical attention if any new/worsening symptoms occur.

## 2023-07-09 LAB — BACTERIA UR CULT: NORMAL

## 2023-07-13 ENCOUNTER — TELEPHONE (OUTPATIENT)
Dept: PRIMARY CARE CLINIC | Age: 38
End: 2023-07-13

## 2023-07-13 NOTE — TELEPHONE ENCOUNTER
Mom calling stating [facility] currently does not have Boost and asking if we can send an order that states pt can have Boost or Ensure  6 cans per day so they cad send either or depending on what they have in stock     Fax [phone]

## 2023-07-14 ENCOUNTER — TELEPHONE (OUTPATIENT)
Dept: PRIMARY CARE CLINIC | Age: 38
End: 2023-07-14

## 2023-07-14 NOTE — TELEPHONE ENCOUNTER
Already faxed order over to [facility] earlier today. Calling to notify pt.

## 2023-07-14 NOTE — TELEPHONE ENCOUNTER
----- Message from Joel Hernandez MD sent at 7/14/2023  3:03 PM EDT -----  Regarding: FW: Enteral feeding (forwarded request)  PLEASE MAKE SURE THIS IS TAKEN CARE OF  ----- Message -----  From: Jessica Oswald RN  Sent: 7/14/2023   1:05 PM EDT  To: Joel Hernandez MD  Subject: Enteral feeding (forwarded request)              Please see pt request as written below. The company that supplies Sreekanth's  enteral feedings   [facility] now has no Boost. Can you pls send them a new prescription for Ensure [id number], either or, with the preference on Ensure . Thank you,   Derrell Alves- [facility]  [phone]  I apologize but it is a different scenario every month. Can someone please call me with your decision?

## 2024-01-21 ENCOUNTER — APPOINTMENT (OUTPATIENT)
Dept: CT IMAGING | Age: 39
End: 2024-01-21
Payer: MEDICARE

## 2024-01-21 ENCOUNTER — HOSPITAL ENCOUNTER (EMERGENCY)
Age: 39
Discharge: HOME | End: 2024-01-21
Attending: EMERGENCY MEDICINE
Payer: MEDICARE

## 2024-01-21 VITALS
BODY MASS INDEX: 19.34 KG/M2 | DIASTOLIC BLOOD PRESSURE: 78 MMHG | HEIGHT: 60 IN | RESPIRATION RATE: 18 BRPM | TEMPERATURE: 98.7 F | HEART RATE: 87 BPM | SYSTOLIC BLOOD PRESSURE: 118 MMHG | WEIGHT: 98.5 LBS | OXYGEN SATURATION: 97 %

## 2024-01-21 DIAGNOSIS — N40.0 BENIGN PROSTATIC HYPERPLASIA, UNSPECIFIED WHETHER LOWER URINARY TRACT SYMPTOMS PRESENT: ICD-10-CM

## 2024-01-21 DIAGNOSIS — E86.0 DEHYDRATION: Primary | ICD-10-CM

## 2024-01-21 DIAGNOSIS — K01.1 IMPACTED TEETH: ICD-10-CM

## 2024-01-21 LAB
ALBUMIN SERPL-MCNC: 4.5 G/DL (ref 3.5–5.2)
ALP SERPL-CCNC: 103 U/L (ref 40–129)
ALT SERPL-CCNC: 14 U/L (ref 0–40)
AMYLASE SERPL-CCNC: 51 U/L (ref 20–100)
ANION GAP SERPL CALCULATED.3IONS-SCNC: 12 MMOL/L (ref 7–16)
AST SERPL-CCNC: 25 U/L (ref 0–39)
BILIRUB SERPL-MCNC: 0.3 MG/DL (ref 0–1.2)
BUN SERPL-MCNC: 13 MG/DL (ref 6–20)
CALCIUM SERPL-MCNC: 10.2 MG/DL (ref 8.6–10.2)
CHLORIDE SERPL-SCNC: 103 MMOL/L (ref 98–107)
CO2 SERPL-SCNC: 29 MMOL/L (ref 22–29)
CREAT SERPL-MCNC: 0.6 MG/DL (ref 0.7–1.2)
ERYTHROCYTE [DISTWIDTH] IN BLOOD BY AUTOMATED COUNT: 12.5 % (ref 11.5–15)
FLUAV RNA RESP QL NAA+PROBE: NOT DETECTED
FLUBV RNA RESP QL NAA+PROBE: NOT DETECTED
GFR SERPL CREATININE-BSD FRML MDRD: >60 ML/MIN/1.73M2
GLUCOSE SERPL-MCNC: 94 MG/DL (ref 74–99)
HCT VFR BLD AUTO: 44.6 % (ref 37–54)
HGB BLD-MCNC: 15.3 G/DL (ref 12.5–16.5)
LACTATE BLDV-SCNC: 1.7 MMOL/L (ref 0.5–2.2)
LACTATE BLDV-SCNC: 3.1 MMOL/L (ref 0.5–2.2)
LIPASE SERPL-CCNC: 49 U/L (ref 13–60)
MAGNESIUM SERPL-MCNC: 2 MG/DL (ref 1.6–2.6)
MCH RBC QN AUTO: 33.2 PG (ref 26–35)
MCHC RBC AUTO-ENTMCNC: 34.3 G/DL (ref 32–34.5)
MCV RBC AUTO: 96.7 FL (ref 80–99.9)
PLATELET # BLD AUTO: 147 K/UL (ref 130–450)
PLATELET CONFIRMATION: NORMAL
PMV BLD AUTO: 13.5 FL (ref 7–12)
POTASSIUM SERPL-SCNC: 4.4 MMOL/L (ref 3.5–5)
PROT SERPL-MCNC: 8.2 G/DL (ref 6.4–8.3)
PSA SERPL-MCNC: 0.96 NG/ML (ref 0–4)
RBC # BLD AUTO: 4.61 M/UL (ref 3.8–5.8)
RSV BY PCR: NOT DETECTED
SARS-COV-2 RNA RESP QL NAA+PROBE: NOT DETECTED
SODIUM SERPL-SCNC: 144 MMOL/L (ref 132–146)
SOURCE: NORMAL
SPECIMEN DESCRIPTION: NORMAL
SPECIMEN SOURCE: NORMAL
WBC OTHER # BLD: 7.2 K/UL (ref 4.5–11.5)

## 2024-01-21 PROCEDURE — 71250 CT THORAX DX C-: CPT

## 2024-01-21 PROCEDURE — 96365 THER/PROPH/DIAG IV INF INIT: CPT

## 2024-01-21 PROCEDURE — 87634 RSV DNA/RNA AMP PROBE: CPT

## 2024-01-21 PROCEDURE — 83690 ASSAY OF LIPASE: CPT

## 2024-01-21 PROCEDURE — 2500000003 HC RX 250 WO HCPCS: Performed by: EMERGENCY MEDICINE

## 2024-01-21 PROCEDURE — 84153 ASSAY OF PSA TOTAL: CPT

## 2024-01-21 PROCEDURE — 82150 ASSAY OF AMYLASE: CPT

## 2024-01-21 PROCEDURE — 96372 THER/PROPH/DIAG INJ SC/IM: CPT

## 2024-01-21 PROCEDURE — 6360000002 HC RX W HCPCS: Performed by: EMERGENCY MEDICINE

## 2024-01-21 PROCEDURE — 70450 CT HEAD/BRAIN W/O DYE: CPT

## 2024-01-21 PROCEDURE — 83735 ASSAY OF MAGNESIUM: CPT

## 2024-01-21 PROCEDURE — 80053 COMPREHEN METABOLIC PANEL: CPT

## 2024-01-21 PROCEDURE — 96361 HYDRATE IV INFUSION ADD-ON: CPT

## 2024-01-21 PROCEDURE — 96375 TX/PRO/DX INJ NEW DRUG ADDON: CPT

## 2024-01-21 PROCEDURE — 85027 COMPLETE CBC AUTOMATED: CPT

## 2024-01-21 PROCEDURE — 70486 CT MAXILLOFACIAL W/O DYE: CPT

## 2024-01-21 PROCEDURE — 99284 EMERGENCY DEPT VISIT MOD MDM: CPT

## 2024-01-21 PROCEDURE — 2580000003 HC RX 258: Performed by: EMERGENCY MEDICINE

## 2024-01-21 PROCEDURE — 87636 SARSCOV2 & INF A&B AMP PRB: CPT

## 2024-01-21 PROCEDURE — 83605 ASSAY OF LACTIC ACID: CPT

## 2024-01-21 PROCEDURE — 74176 CT ABD & PELVIS W/O CONTRAST: CPT

## 2024-01-21 PROCEDURE — 72125 CT NECK SPINE W/O DYE: CPT

## 2024-01-21 RX ORDER — AMOXICILLIN AND CLAVULANATE POTASSIUM 250; 62.5 MG/5ML; MG/5ML
25 POWDER, FOR SUSPENSION ORAL 2 TIMES DAILY
Qty: 224 ML | Refills: 0 | Status: SHIPPED | OUTPATIENT
Start: 2024-01-21 | End: 2024-01-31

## 2024-01-21 RX ORDER — LORAZEPAM 2 MG/ML
0.5 INJECTION INTRAMUSCULAR PRN
Status: DISCONTINUED | OUTPATIENT
Start: 2024-01-21 | End: 2024-01-21 | Stop reason: HOSPADM

## 2024-01-21 RX ORDER — KETAMINE HYDROCHLORIDE 10 MG/ML
50 INJECTION, SOLUTION INTRAMUSCULAR; INTRAVENOUS ONCE
Status: DISCONTINUED | OUTPATIENT
Start: 2024-01-21 | End: 2024-01-21

## 2024-01-21 RX ORDER — AMOXICILLIN AND CLAVULANATE POTASSIUM 250; 62.5 MG/5ML; MG/5ML
750 POWDER, FOR SUSPENSION ORAL 2 TIMES DAILY
Status: DISCONTINUED | OUTPATIENT
Start: 2024-01-21 | End: 2024-01-21 | Stop reason: HOSPADM

## 2024-01-21 RX ORDER — 0.9 % SODIUM CHLORIDE 0.9 %
1000 INTRAVENOUS SOLUTION INTRAVENOUS ONCE
Status: COMPLETED | OUTPATIENT
Start: 2024-01-21 | End: 2024-01-21

## 2024-01-21 RX ORDER — KETAMINE HYDROCHLORIDE 10 MG/ML
25 INJECTION, SOLUTION INTRAMUSCULAR; INTRAVENOUS PRN
Status: DISCONTINUED | OUTPATIENT
Start: 2024-01-21 | End: 2024-01-21 | Stop reason: HOSPADM

## 2024-01-21 RX ORDER — LORAZEPAM 2 MG/ML
1 INJECTION INTRAMUSCULAR ONCE
Status: COMPLETED | OUTPATIENT
Start: 2024-01-21 | End: 2024-01-21

## 2024-01-21 RX ADMIN — SODIUM CHLORIDE 3000 MG: 9 INJECTION, SOLUTION INTRAVENOUS at 05:37

## 2024-01-21 RX ADMIN — KETAMINE HYDROCHLORIDE 25 MG: 10 INJECTION, SOLUTION INTRAMUSCULAR; INTRAVENOUS at 04:17

## 2024-01-21 RX ADMIN — SODIUM CHLORIDE 1000 ML: 9 INJECTION, SOLUTION INTRAVENOUS at 04:13

## 2024-01-21 RX ADMIN — LORAZEPAM 1 MG: 2 INJECTION INTRAMUSCULAR; INTRAVENOUS at 04:09

## 2024-01-21 RX ADMIN — KETAMINE HYDROCHLORIDE 25 MG: 10 INJECTION, SOLUTION INTRAMUSCULAR; INTRAVENOUS at 04:36

## 2024-01-21 NOTE — DISCHARGE INSTRUCTIONS
Follow-up with oral surgeon's soon as possible as well as follow-up with enlarged prostate did order a PSA or prostatic specific antigen a she could follow-up as an outpatient with urologist

## 2024-01-21 NOTE — ED PROVIDER NOTES
HPI:  1/21/24,   Time: 4:05 AM PAIGE Welsh is a 38 y.o. male presenting to the ED for not acting himself hitting himself appears to be uncomfortable, beginning 1 month ago.  The complaint has been constant, mild in severity, and worsened by nothing.  She has a PEG tube colostomy vital signs are stable treatment hitting his head a lot which she does not normally do he is nonverbal and does not really follow commands is not able to answer things affirmative or negative he walks with assistance apparently he has been urinating he wears a diaper there is no trauma noted on him he does not appear to have any focal deficits but he is nonverbal and his arms are somewhat rigid which is not new as well as his legs he does appear to have some possible dental issues but was not unable to get a good view of his mouth with full of his mouth    ROS:   Pertinent positives and negatives are stated within HPI, all other systems reviewed and are negative.  --------------------------------------------- PAST HISTORY ---------------------------------------------  Past Medical History:  has a past medical history of Cerebral palsy (HCC), Hirschsprung disease, Hypospadias, Mental retardation, and Pulmonic stenosis.    Past Surgical History:  has a past surgical history that includes colostomy; Gastrostomy tube placement; Patent ductus arterious ligation; Foot surgery; Testicle removal; Gastric fundoplication; and Rectal surgery.    Social History:  reports that he has never smoked. He has never used smokeless tobacco. He reports that he does not drink alcohol and does not use drugs.    Family History: family history is not on file.     The patient’s home medications have been reviewed.    Allergies: Lactose, Lactose intolerance (gi), Other, Peg-2000 (covid-19 mrna vaccine component), and Seasonal    -------------------------------------------------- RESULTS -------------------------------------------------  All laboratory

## 2024-01-23 ENCOUNTER — OFFICE VISIT (OUTPATIENT)
Dept: PRIMARY CARE CLINIC | Age: 39
End: 2024-01-23
Payer: MEDICARE

## 2024-01-23 VITALS
HEIGHT: 60 IN | SYSTOLIC BLOOD PRESSURE: 120 MMHG | WEIGHT: 98.31 LBS | OXYGEN SATURATION: 98 % | HEART RATE: 90 BPM | TEMPERATURE: 97.3 F | DIASTOLIC BLOOD PRESSURE: 66 MMHG | BODY MASS INDEX: 19.3 KG/M2

## 2024-01-23 DIAGNOSIS — Z93.1 S/P NISSEN FUNDOPLICATION (WITH GASTROSTOMY TUBE PLACEMENT) (HCC): ICD-10-CM

## 2024-01-23 DIAGNOSIS — K01.1 IMPACTED TEETH: Primary | ICD-10-CM

## 2024-01-23 DIAGNOSIS — E83.42 HYPOMAGNESEMIA: ICD-10-CM

## 2024-01-23 DIAGNOSIS — F72 SEVERE INTELLECTUAL DISABILITIES: ICD-10-CM

## 2024-01-23 DIAGNOSIS — G40.909 SEIZURE DISORDER (HCC): ICD-10-CM

## 2024-01-23 DIAGNOSIS — Z00.00 INITIAL MEDICARE ANNUAL WELLNESS VISIT: Primary | ICD-10-CM

## 2024-01-23 DIAGNOSIS — G80.8 OTHER CEREBRAL PALSY (HCC): ICD-10-CM

## 2024-01-23 DIAGNOSIS — Q43.1 HIRSCHSPRUNG'S DISEASE: ICD-10-CM

## 2024-01-23 DIAGNOSIS — D69.6 THROMBOCYTOPENIA, UNSPECIFIED (HCC): ICD-10-CM

## 2024-01-23 PROCEDURE — G0438 PPPS, INITIAL VISIT: HCPCS | Performed by: FAMILY MEDICINE

## 2024-01-23 PROCEDURE — G8427 DOCREV CUR MEDS BY ELIG CLIN: HCPCS | Performed by: FAMILY MEDICINE

## 2024-01-23 PROCEDURE — 99214 OFFICE O/P EST MOD 30 MIN: CPT | Performed by: FAMILY MEDICINE

## 2024-01-23 PROCEDURE — G8420 CALC BMI NORM PARAMETERS: HCPCS | Performed by: FAMILY MEDICINE

## 2024-01-23 PROCEDURE — G8484 FLU IMMUNIZE NO ADMIN: HCPCS | Performed by: FAMILY MEDICINE

## 2024-01-23 PROCEDURE — 1036F TOBACCO NON-USER: CPT | Performed by: FAMILY MEDICINE

## 2024-01-23 ASSESSMENT — PATIENT HEALTH QUESTIONNAIRE - PHQ9: DEPRESSION UNABLE TO ASSESS: FUNCTIONAL CAPACITY MOTIVATION LIMITS ACCURACY

## 2024-01-23 ASSESSMENT — LIFESTYLE VARIABLES
HOW OFTEN DO YOU HAVE A DRINK CONTAINING ALCOHOL: NEVER
HOW MANY STANDARD DRINKS CONTAINING ALCOHOL DO YOU HAVE ON A TYPICAL DAY: PATIENT DOES NOT DRINK

## 2024-01-23 NOTE — PROGRESS NOTES
Medicare Annual Wellness Visit    Sreekanth Welsh is here for Medicare AWV    Assessment & Plan   Assessment and Plan:   Diagnosis Orders   1. Initial Medicare annual wellness visit               No problem-specific Assessment & Plan notes found for this encounter.       Plan as above.  Counseled extensively and differential diagnoses relevant to above were reviewed, including serious etiologies.   Side effects and interactions of medications were reviewed.      Health maintenance issues discussed at length as above, 1/23/2024 .  Encouraged yearly physicals.          As long as symptoms steadily improve/resolve, and medical conditions follow the expected course, FU as below, as previously directed and sooner PRN.    Return for Medicare Annual Wellness Visit in 1 year.        Signs and symptoms to watch for discussed, serious signs and symptoms reviewed.  ER if any.         Rogelio Landaverde MD    Patients are advised to check with insurance company to ensure coverage and to fully understand benefits and cost prior to any testing.  This note was created with the assistance of voice recognition software.  Document was reviewed however may contain grammatical errors.    Recommendations for Preventive Services Due: see orders and patient instructions/AVS.  Recommended screening schedule for the next 5-10 years is provided to the patient in written form: see Patient Instructions/AVS.     Return for Medicare Annual Wellness Visit in 1 year.     Subjective   The following acute and/or chronic problems were also addressed today:  See other note.  Annual forms completed today as well    Patient's complete Health Risk Assessment and screening values have been reviewed and are found in Flowsheets. The following problems were reviewed today and where indicated follow up appointments were made and/or referrals ordered.    Positive Risk Factor Screenings with Interventions:    Fall Risk:  Do you feel unsteady or are you worried

## 2024-01-23 NOTE — PROGRESS NOTES
extensively. Differential reviewed, including serious etiologies.  Stable, last check normal     Seizure disorder (HCC)  Continue per neurology.  Mom defers levels to them.  Tolerating medications.  ADRs interactions reviewed.  Seizure precautions.      Other dysphagia  Is strict NPO.  Has G-tube     Health maintenance examination  Counseled at [facility], see separate encounter, 1/23/2024.  Encourage yearly Counseled on vaccines.     Rash  Chronic intermittent irritation around the G-tube but does very well with compound cream through: Pharmacy including Maalox/Aquaphor/zinc oxide/nystatin/0.5% hydrocortisone, not for long-term use, may use 1 to 2 weeks on/1 week off as needed.  Stable      Urinary retention  Symptoms back to baseline.  Continue per Dr. Ramirez.  Had renal ultrasound.  Prostatomegaly on 1/24 CT however PSA that day low at 0.96.  May follow-up Dr. Ramirez if concerns.    Macrocytosis  Subsequently normalized  , past Vitamin B12/folic acid actually high    Hypomagnesemia  On supplement.  Monitor.  Last check normal             No data to display                Plan as above.  Counseled extensively and differential diagnoses relevant to above were reviewed, including serious etiologies, risks and complications, especially of left uncontrolled.  If relevant, instructions and  alternatives to meds/treatment reviewed, as well as interactions, and  SE's/ADRs reviewed, notify immediately if any, discontinuing new meds if any.  Plan made after discussion and shared decision making.    Counseled.  Differential reviewed.  Continue per multiple specialist.  Notify if any symptoms persist or worsen.  Dental H&P completed.  At this time they simply want to follow-up yearly physical sooner again as needed, if symptoms persist or worsen or any issues not being addressed/concerns.  Defers additional blood work until next year.  Precaution reviewed.  Mom doing excellent job caring for patient.           As long as

## 2024-01-29 ENCOUNTER — PREP FOR PROCEDURE (OUTPATIENT)
Dept: SURGERY | Age: 39
End: 2024-01-29

## 2024-01-29 RX ORDER — SODIUM CHLORIDE 0.9 % (FLUSH) 0.9 %
5-40 SYRINGE (ML) INJECTION PRN
Status: CANCELLED | OUTPATIENT
Start: 2024-02-06

## 2024-01-29 RX ORDER — SODIUM CHLORIDE 9 MG/ML
INJECTION, SOLUTION INTRAVENOUS PRN
Status: CANCELLED | OUTPATIENT
Start: 2024-02-06

## 2024-01-29 RX ORDER — SODIUM CHLORIDE 9 MG/ML
INJECTION, SOLUTION INTRAVENOUS CONTINUOUS
Status: CANCELLED | OUTPATIENT
Start: 2024-02-06

## 2024-01-29 RX ORDER — SODIUM CHLORIDE 0.9 % (FLUSH) 0.9 %
5-40 SYRINGE (ML) INJECTION EVERY 12 HOURS SCHEDULED
Status: CANCELLED | OUTPATIENT
Start: 2024-02-06

## 2024-01-31 NOTE — PROGRESS NOTES
[facility]   PRE-ADMISSION TESTING GENERAL INSTRUCTIONS  PAT Phone Number: [phone]      GENERAL INSTRUCTIONS:    [x] Antibacterial Soap Shower Night before or AM of surgery.  [] CHG Wipes instruction sheet and wipes given.  [] Hibiclens shower the night before AND the morning of surgery.   -Do not use Hibiclens on your face or head.   [x] Do not wear lotions, powders, deodorant.   [x] Nothing by mouth after midnight including tube feeding; including gum, candy, mints, or water.  [x] You may brush your teeth, gargle, but do NOT swallow water.   [x] No tobacco products, illegal drugs, or alcohol within 24 hours of your surgery.  [x] Jewelry or valuables should not be brought to the hospital. All body and/or tongue piercing's must be removed prior to arriving to hospital. No contact lens or hair pins.   [x] Arrange transportation with a responsible adult  to and from the hospital. Arrange for someone to be with you for the remainder of the day and for 24 hours after your procedure due to having had anesthesia.          -Who will be your  for transportation? Meredith-mom        -Who will be staying with you for 24 hrs after your procedure? Meredith-mom  [x] Bring insurance card and photo ID.  [x] Bring copy of living will or healthcare power of  papers to be placed in your electronic record.  [] Urine Pregnancy test will be preformed the day of surgery. A specimen sample may be brought from home.  [] Transfusion (Green) Bracelet: Please bring with you to hospital, day of surgery.     PARKING INSTRUCTIONS:     [x] ARRIVAL DATE & TIME:  2/6 at 5:30 am   [x] Times are subject to change. We will contact you the business day before surgery if that were to occur.  [x] Enter into the [facility] Entrance. Two people may accompany you. Masks are not required.  [x] Parking Lot \"I\" is where you will park. It is located on the corner of [address] and [facility] [address]. The

## 2024-02-05 ENCOUNTER — ANESTHESIA EVENT (OUTPATIENT)
Dept: OPERATING ROOM | Age: 39
End: 2024-02-05
Payer: MEDICARE

## 2024-02-06 ENCOUNTER — ANESTHESIA (OUTPATIENT)
Dept: OPERATING ROOM | Age: 39
End: 2024-02-06
Payer: MEDICARE

## 2024-02-06 ENCOUNTER — HOSPITAL ENCOUNTER (OUTPATIENT)
Age: 39
Setting detail: OUTPATIENT SURGERY
Discharge: HOME | End: 2024-02-06
Attending: ORAL & MAXILLOFACIAL SURGERY | Admitting: ORAL & MAXILLOFACIAL SURGERY
Payer: MEDICARE

## 2024-02-06 VITALS
RESPIRATION RATE: 20 BRPM | WEIGHT: 98 LBS | TEMPERATURE: 98.4 F | HEART RATE: 95 BPM | SYSTOLIC BLOOD PRESSURE: 115 MMHG | BODY MASS INDEX: 19.24 KG/M2 | DIASTOLIC BLOOD PRESSURE: 82 MMHG | HEIGHT: 60 IN | OXYGEN SATURATION: 99 %

## 2024-02-06 PROCEDURE — 7100000001 HC PACU RECOVERY - ADDTL 15 MIN: Performed by: ORAL & MAXILLOFACIAL SURGERY

## 2024-02-06 PROCEDURE — 3700000001 HC ADD 15 MINUTES (ANESTHESIA): Performed by: ORAL & MAXILLOFACIAL SURGERY

## 2024-02-06 PROCEDURE — 6360000002 HC RX W HCPCS: Performed by: ORAL & MAXILLOFACIAL SURGERY

## 2024-02-06 PROCEDURE — 3600000012 HC SURGERY LEVEL 2 ADDTL 15MIN: Performed by: ORAL & MAXILLOFACIAL SURGERY

## 2024-02-06 PROCEDURE — 6360000002 HC RX W HCPCS

## 2024-02-06 PROCEDURE — 2500000003 HC RX 250 WO HCPCS: Performed by: ORAL & MAXILLOFACIAL SURGERY

## 2024-02-06 PROCEDURE — 2580000003 HC RX 258

## 2024-02-06 PROCEDURE — 7100000000 HC PACU RECOVERY - FIRST 15 MIN: Performed by: ORAL & MAXILLOFACIAL SURGERY

## 2024-02-06 PROCEDURE — 6370000000 HC RX 637 (ALT 250 FOR IP): Performed by: ANESTHESIOLOGY

## 2024-02-06 PROCEDURE — 7100000010 HC PHASE II RECOVERY - FIRST 15 MIN: Performed by: ORAL & MAXILLOFACIAL SURGERY

## 2024-02-06 PROCEDURE — 7100000011 HC PHASE II RECOVERY - ADDTL 15 MIN: Performed by: ORAL & MAXILLOFACIAL SURGERY

## 2024-02-06 PROCEDURE — 2500000003 HC RX 250 WO HCPCS

## 2024-02-06 PROCEDURE — 2580000003 HC RX 258: Performed by: ORAL & MAXILLOFACIAL SURGERY

## 2024-02-06 PROCEDURE — 3700000000 HC ANESTHESIA ATTENDED CARE: Performed by: ORAL & MAXILLOFACIAL SURGERY

## 2024-02-06 PROCEDURE — 6370000000 HC RX 637 (ALT 250 FOR IP): Performed by: ORAL & MAXILLOFACIAL SURGERY

## 2024-02-06 PROCEDURE — 2709999900 HC NON-CHARGEABLE SUPPLY: Performed by: ORAL & MAXILLOFACIAL SURGERY

## 2024-02-06 PROCEDURE — 3600000002 HC SURGERY LEVEL 2 BASE: Performed by: ORAL & MAXILLOFACIAL SURGERY

## 2024-02-06 RX ORDER — SODIUM CHLORIDE 9 MG/ML
INJECTION, SOLUTION INTRAVENOUS CONTINUOUS PRN
Status: DISCONTINUED | OUTPATIENT
Start: 2024-02-06 | End: 2024-02-06 | Stop reason: SDUPTHER

## 2024-02-06 RX ORDER — HYDROMORPHONE HYDROCHLORIDE 1 MG/ML
0.25 INJECTION, SOLUTION INTRAMUSCULAR; INTRAVENOUS; SUBCUTANEOUS EVERY 5 MIN PRN
Status: DISCONTINUED | OUTPATIENT
Start: 2024-02-06 | End: 2024-02-06 | Stop reason: HOSPADM

## 2024-02-06 RX ORDER — PROPOFOL 10 MG/ML
INJECTION, EMULSION INTRAVENOUS PRN
Status: DISCONTINUED | OUTPATIENT
Start: 2024-02-06 | End: 2024-02-06 | Stop reason: SDUPTHER

## 2024-02-06 RX ORDER — SODIUM CHLORIDE 0.9 % (FLUSH) 0.9 %
5-40 SYRINGE (ML) INJECTION EVERY 12 HOURS SCHEDULED
Status: DISCONTINUED | OUTPATIENT
Start: 2024-02-06 | End: 2024-02-06 | Stop reason: HOSPADM

## 2024-02-06 RX ORDER — MEPERIDINE HYDROCHLORIDE 25 MG/ML
12.5 INJECTION INTRAMUSCULAR; INTRAVENOUS; SUBCUTANEOUS
Status: DISCONTINUED | OUTPATIENT
Start: 2024-02-06 | End: 2024-02-06 | Stop reason: HOSPADM

## 2024-02-06 RX ORDER — SODIUM CHLORIDE 9 MG/ML
INJECTION, SOLUTION INTRAVENOUS CONTINUOUS
Status: DISCONTINUED | OUTPATIENT
Start: 2024-02-06 | End: 2024-02-06 | Stop reason: HOSPADM

## 2024-02-06 RX ORDER — SODIUM CHLORIDE 0.9 % (FLUSH) 0.9 %
5-40 SYRINGE (ML) INJECTION PRN
Status: DISCONTINUED | OUTPATIENT
Start: 2024-02-06 | End: 2024-02-06 | Stop reason: HOSPADM

## 2024-02-06 RX ORDER — FENTANYL CITRATE 50 UG/ML
INJECTION, SOLUTION INTRAMUSCULAR; INTRAVENOUS PRN
Status: DISCONTINUED | OUTPATIENT
Start: 2024-02-06 | End: 2024-02-06 | Stop reason: SDUPTHER

## 2024-02-06 RX ORDER — SODIUM CHLORIDE 9 MG/ML
INJECTION, SOLUTION INTRAVENOUS PRN
Status: DISCONTINUED | OUTPATIENT
Start: 2024-02-06 | End: 2024-02-06 | Stop reason: HOSPADM

## 2024-02-06 RX ORDER — NALOXONE HYDROCHLORIDE 0.4 MG/ML
INJECTION, SOLUTION INTRAMUSCULAR; INTRAVENOUS; SUBCUTANEOUS PRN
Status: DISCONTINUED | OUTPATIENT
Start: 2024-02-06 | End: 2024-02-06 | Stop reason: SDUPTHER

## 2024-02-06 RX ORDER — DEXAMETHASONE SODIUM PHOSPHATE 10 MG/ML
8 INJECTION INTRAMUSCULAR; INTRAVENOUS ONCE
Status: DISCONTINUED | OUTPATIENT
Start: 2024-02-06 | End: 2024-02-06 | Stop reason: HOSPADM

## 2024-02-06 RX ORDER — ONDANSETRON 2 MG/ML
4 INJECTION INTRAMUSCULAR; INTRAVENOUS
Status: DISCONTINUED | OUTPATIENT
Start: 2024-02-06 | End: 2024-02-06 | Stop reason: HOSPADM

## 2024-02-06 RX ORDER — DEXAMETHASONE SODIUM PHOSPHATE 10 MG/ML
INJECTION INTRAMUSCULAR; INTRAVENOUS PRN
Status: DISCONTINUED | OUTPATIENT
Start: 2024-02-06 | End: 2024-02-06 | Stop reason: SDUPTHER

## 2024-02-06 RX ORDER — MIDAZOLAM HYDROCHLORIDE 2 MG/ML
20 SYRUP ORAL ONCE
Status: DISCONTINUED | OUTPATIENT
Start: 2024-02-06 | End: 2024-02-06

## 2024-02-06 RX ORDER — METRONIDAZOLE 500 MG/1
500 TABLET ORAL 2 TIMES DAILY
COMMUNITY

## 2024-02-06 RX ORDER — LIDOCAINE HYDROCHLORIDE AND EPINEPHRINE 10; 10 MG/ML; UG/ML
INJECTION, SOLUTION INFILTRATION; PERINEURAL PRN
Status: DISCONTINUED | OUTPATIENT
Start: 2024-02-06 | End: 2024-02-06 | Stop reason: HOSPADM

## 2024-02-06 RX ORDER — HYDROMORPHONE HYDROCHLORIDE 1 MG/ML
0.5 INJECTION, SOLUTION INTRAMUSCULAR; INTRAVENOUS; SUBCUTANEOUS EVERY 5 MIN PRN
Status: DISCONTINUED | OUTPATIENT
Start: 2024-02-06 | End: 2024-02-06 | Stop reason: HOSPADM

## 2024-02-06 RX ORDER — LIDOCAINE HYDROCHLORIDE 20 MG/ML
INJECTION, SOLUTION INTRAVENOUS PRN
Status: DISCONTINUED | OUTPATIENT
Start: 2024-02-06 | End: 2024-02-06 | Stop reason: SDUPTHER

## 2024-02-06 RX ORDER — MIDAZOLAM HYDROCHLORIDE 2 MG/ML
20 SYRUP ORAL ONCE
Status: COMPLETED | OUTPATIENT
Start: 2024-02-06 | End: 2024-02-06

## 2024-02-06 RX ORDER — ROCURONIUM BROMIDE 10 MG/ML
INJECTION, SOLUTION INTRAVENOUS PRN
Status: DISCONTINUED | OUTPATIENT
Start: 2024-02-06 | End: 2024-02-06 | Stop reason: SDUPTHER

## 2024-02-06 RX ORDER — ONDANSETRON 2 MG/ML
INJECTION INTRAMUSCULAR; INTRAVENOUS PRN
Status: DISCONTINUED | OUTPATIENT
Start: 2024-02-06 | End: 2024-02-06 | Stop reason: SDUPTHER

## 2024-02-06 RX ADMIN — ONDANSETRON 4 MG: 2 INJECTION INTRAMUSCULAR; INTRAVENOUS at 07:23

## 2024-02-06 RX ADMIN — SODIUM CHLORIDE: 0.9 INJECTION, SOLUTION INTRAVENOUS at 07:09

## 2024-02-06 RX ADMIN — SUGAMMADEX 200 MG: 100 INJECTION, SOLUTION INTRAVENOUS at 07:40

## 2024-02-06 RX ADMIN — MIDAZOLAM HYDROCHLORIDE 20 MG: 2 SYRUP ORAL at 06:36

## 2024-02-06 RX ADMIN — FENTANYL CITRATE 50 MCG: 50 INJECTION, SOLUTION INTRAMUSCULAR; INTRAVENOUS at 07:14

## 2024-02-06 RX ADMIN — PROPOFOL 50 MG: 10 INJECTION, EMULSION INTRAVENOUS at 07:14

## 2024-02-06 RX ADMIN — DEXAMETHASONE SODIUM PHOSPHATE 10 MG: 10 INJECTION INTRAMUSCULAR; INTRAVENOUS at 07:23

## 2024-02-06 RX ADMIN — NALXONE HYDROCHLORIDE 0.08 MG: 0.4 INJECTION INTRAMUSCULAR; INTRAVENOUS; SUBCUTANEOUS at 07:47

## 2024-02-06 RX ADMIN — ROCURONIUM BROMIDE 30 MG: 10 INJECTION, SOLUTION INTRAVENOUS at 07:14

## 2024-02-06 RX ADMIN — CEFAZOLIN 1000 MG: 1 INJECTION, POWDER, FOR SOLUTION INTRAMUSCULAR; INTRAVENOUS at 07:17

## 2024-02-06 RX ADMIN — NALXONE HYDROCHLORIDE 0.08 MG: 0.4 INJECTION INTRAMUSCULAR; INTRAVENOUS; SUBCUTANEOUS at 07:44

## 2024-02-06 RX ADMIN — LIDOCAINE HYDROCHLORIDE 100 MG: 20 INJECTION, SOLUTION INTRAVENOUS at 07:14

## 2024-02-06 ASSESSMENT — PAIN SCALES - WONG BAKER
WONGBAKER_NUMERICALRESPONSE: 0
WONGBAKER_NUMERICALRESPONSE: 0
WONGBAKER_NUMERICALRESPONSE: NO HURT
WONGBAKER_NUMERICALRESPONSE: 0
WONGBAKER_NUMERICALRESPONSE: 0

## 2024-02-06 ASSESSMENT — PAIN - FUNCTIONAL ASSESSMENT
PAIN_FUNCTIONAL_ASSESSMENT: WONG-BAKER FACES
PAIN_FUNCTIONAL_ASSESSMENT: ADULT NONVERBAL PAIN SCALE (NPVS)

## 2024-02-06 ASSESSMENT — PAIN SCALES - GENERAL: PAINLEVEL_OUTOF10: 0

## 2024-02-06 NOTE — DISCHARGE INSTRUCTIONS
Post-Op Instructions After [place]ade Teeth Removal  The removal of impacted wisdom teeth is a serious surgical procedure. Post-operative care is very important. Unnecessary pain and the complications of infection and swelling can be minimized if the instructions are followed carefully.  Immediately Following Surgery:  The gauze pad placed over the surgical area should be kept in place for a half hour. After this time, the gauze pad should be removed and discarded.   Vigorous mouth rinsing or touching the wound area following surgery should be avoided. This may initiate bleeding by causing the blood clot that has formed to become dislodged.   Take the prescribed pain medications as soon as you begin to feel discomfort this will usually coincide with the local anesthetic becoming diminished.   Restrict your activities the day of surgery and resume normal activity when you feel comfortable.   Place ice packs to the sides of your face where surgery was performed. Refer to the section on swelling for explanation.   Bleeding  A certain amount of bleeding is to be expected following surgery. Slight bleeding, oozing, or redness in the saliva is not uncommon. Excessive bleeding may be controlled by first rinsing or wiping any old clots from your mouth, then placing a gauze pad over the area and biting firmly for thirty minutes. Repeat if necessary. If bleeding continues, bite on a moistened tea bag for thirty minutes. The tannic acid in the tea bag helps to form a clot by prateek bleeding vessels. To minimize further bleeding, do not become excited, sit upright, and avoid exercise. If bleeding does not subside, call for further instructions.  Swelling  The swelling that is normally expected is usually proportional to the surgery involved. Swelling around the mouth, cheeks, eyes and sides of the face is not uncommon. This is the body’s normal reaction to surgery and eventual repair. The swelling will not become apparent

## 2024-02-06 NOTE — BRIEF OP NOTE
Brief Postoperative Note      Patient: Sreekanth Welsh  YOB: 1985  MRN: [id number]    Date of Procedure: 2/6/2024    Pre-Op Diagnosis Codes:     * Impacted tooth [K01.1]    Post-Op Diagnosis: same       Procedure(s):  1 part bone #17 extraction    Surgeon(s):  Mikael Diop DDS    Assistant:  nurse    Anesthesia: General    Estimated Blood Loss (mL): 5 mls    Complications: none    Specimens:   none    Implants:  none      Drains:   Gastrostomy/Enterostomy Gastrostomy (Active)   Site Description Clean, dry & intact 02/06/24 0616   G Port Status Clamped 02/06/24 0616   Surrounding Skin Clean, dry & intact 02/06/24 0616       Colostomy RLQ (Active)       Findings: partially erupted,ankylosed #17      Electronically signed by Mikael Diop DDS on 2/6/2024 at 7:45 AM

## 2024-02-06 NOTE — ANESTHESIA POSTPROCEDURE EVALUATION
Department of Anesthesiology  Postprocedure Note    Patient: Sreekanth Welsh  MRN: [id number]  YOB: 1985  Date of evaluation: 2/6/2024    Procedure Summary       Date: 02/06/24 Room / Location: [address]    Anesthesia Start: 0653 Anesthesia Stop: 0757    Procedure: 1 part bone #17 extraction (Mouth) Diagnosis:       Impacted tooth      (Impacted tooth [K01.1])    Surgeons: Mikael Diop DDS Responsible Provider: Darlene Corley MD    Anesthesia Type: General ASA Status: 3            Anesthesia Type: General    Catarina Phase I: Catarina Score: 10    Catarina Phase II: Catarina Score: 10    Anesthesia Post Evaluation    Patient location during evaluation: PACU  Patient participation: complete - patient participated  Level of consciousness: awake and alert  Airway patency: patent  Nausea & Vomiting: no nausea and no vomiting  Cardiovascular status: blood pressure returned to baseline and hemodynamically stable  Respiratory status: acceptable and spontaneous ventilation  Hydration status: euvolemic  Multimodal analgesia pain management approach  Pain management: adequate    No notable events documented.

## 2024-02-06 NOTE — ANESTHESIA PRE PROCEDURE
Department of Anesthesiology  Preprocedure Note       Name:  Sreekanth Welsh   Age:  38 y.o.  :  1985                                          MRN:  [id number]         Date:  2024      Surgeon: Surgeon(s):  Mikael Diop DDS    Procedure: Procedure(s):  2 PART BONE DENTAL EXTRACTION, 3 SIMPLE EXTRTACTION    Medications prior to admission:   Prior to Admission medications    Medication Sig Start Date End Date Taking? Authorizing Provider   metroNIDAZOLE (FLAGYL) 500 MG tablet Take 1 tablet by mouth 2 times daily   Yes Fransisco Faustin MD   esomeprazole Magnesium (NEXIUM) 20 MG PACK 1 per g tube every day 2/15/23   Rogelio Landaverde MD   Incontinence Supply Disposable ([employer] FITTED BRIEFS DAY/NIGHT MD) MISC USE AS DIRECTED 6/6/22 3/25/24  Rogelio Landaverde MD   Incontinence Supply Disposable ([employer] DAY & NIGHT UNDERPADS) MISC 1 Package by Does not apply route daily 21   Rogelio Landaverde MD   Ostomy Supplies (STOMAHESIVE PROTECTIVE) POWD by Other route Apply to affected area of stoma    Fransisco Faustin MD   Zinc Oxide (BALMEX EX) Apply topically Apply to christal site    Fransisco Faustin MD   Hydrocortisone (PREPARATION H EX) Apply topically Twice a day as needed    Fransisco Faustin MD   Oral Electrolytes (PEDIALYTE PO) Take by mouth prn    Fransisco Faustin MD   Nutritional Supplements (ENSURE PO) Take by mouth 6 cans a day    Fransisco Faustin MD   loratadine 5 MG/5ML solution Take 5 mLs by mouth as needed 10 ml GT QD    Fransisco Faustin MD   valproate (DEPAKENE) 250 MG/5ML solution Take by mouth 3 times daily 5.0 ml tid    Fransisco Faustin MD   ethosuximide (ZARONTIN) 250 MG/5ML solution Take 7.5 mLs by mouth 2 times daily    Fransisco Faustin MD       Current medications:    Current Facility-Administered Medications   Medication Dose Route Frequency Provider Last Rate Last Admin    dexAMETHasone (DECADRON) injection 8 mg  8 mg IntraVENous Once Mikael Diop DDS

## 2024-02-07 NOTE — OP NOTE
[facility]                  [address] [place] [address]                                OPERATIVE REPORT    PATIENT NAME: DIMAS LAMA                     :        1985  MED REC NO:   [id number]                            ROOM:  ACCOUNT NO:   [id number]                           ADMIT DATE: 2024  PROVIDER:     Mikael Diop DDS    DATE OF PROCEDURE:  2024    PREOPERATIVE DIAGNOSIS:  Impacted wisdom tooth #17.    POSTOPERATIVE DIAGNOSIS:  Impacted wisdom tooth #17.    PROCEDURE:  Exam under anesthesia, removal of partially impacted tooth  #17.    SURGEON:  Mikael Diop DDS    ANESTHESIA:  General local anesthesia, 8 mL of 1% lidocaine with  1:100,000 epinephrine.    FLUIDS:  1 L of LR.    ESTIMATED BLOOD LOSS:  5 mL.    COMPLICATIONS:  None.    DRAINS:  None.    IMPLANTS:  None placed.    FINDINGS:  Multiple teeth crowded yet no dental caries; tooth number 17,  partially erupted, horizontally impacted    DESCRIPTION OF PROCEDURE:  The patient was taken to operating room #7,  orally intubated atraumatically.  Throat pack placed under direct  visualization and lighting after the patient was prepped in the usual  fashion.  Exam under anesthesia yielded no foreign bodies noted within  the mouth or between the teeth per concerns of mother.  No purulence was  noted although partially erupted tooth #17 has some mild erythema around  the soft tissue area.  All other teeth appeared to be intact and without  dental decay.  Tooth #17 was exposed using a buccal hockey stick-type  incision.  Straight bur then was utilized to suction the tooth.  Tooth  was notably ankylosed to the mandible and #4 bur then was utilized to  remove as much of the apical portion of the root of 17 as possible.   Copious irrigation, Surgicel, and 3-0 chromic gut suture to close.  No  foreign bodies noted within the mouth.  Throat packs removed under  direct visualization  and lighting.  The patient had previously received  IV ______ 1 gm.  The patient had just recently finished Augmentin 3 days  preop.  Therefore, no antibiotic was prescribed at this time.   Over-the-counter pain medicine will be taken by the patient with other  postoperative instructions given in written and oral form.  The patient  was taken to PACU in stable condition after being extubated and to  follow up in my office with any problems or issues.        USHA EDGE DDS    D: 02/06/2024 18:16:14       T: 02/06/2024 18:18:48     [id number]  Job#: [id number]     Doc#: [id number]    CC:

## 2024-02-20 PROBLEM — E86.0 DEHYDRATION: Status: RESOLVED | Noted: 2024-01-21 | Resolved: 2024-02-20

## 2024-03-07 ENCOUNTER — PATIENT MESSAGE (OUTPATIENT)
Dept: PRIMARY CARE CLINIC | Age: 39
End: 2024-03-07

## 2024-03-07 DIAGNOSIS — K21.9 GASTROESOPHAGEAL REFLUX DISEASE WITHOUT ESOPHAGITIS: ICD-10-CM

## 2024-03-07 RX ORDER — ESOMEPRAZOLE MAGNESIUM 20 MG/1
GRANULE, DELAYED RELEASE ORAL
Qty: 30 PACKET | Refills: 12 | Status: SHIPPED | OUTPATIENT
Start: 2024-03-07

## 2024-03-07 NOTE — TELEPHONE ENCOUNTER
From: Sreekanth Welsh  To: Dr. Rogelio Landaverde  Sent: 3/7/2024 7:55 AM EST  Subject: refill request    Good morning,  Can you please send a new prescription for Esomeprazole Magnesium Dr 20 Mg Erna Mchugh to [facility] in [place] for Sreekanth?  Thank you in advance!  Meredith Welsh (Mom)

## 2024-03-22 ENCOUNTER — OFFICE VISIT (OUTPATIENT)
Dept: PRIMARY CARE CLINIC | Age: 39
End: 2024-03-22

## 2024-03-22 VITALS
OXYGEN SATURATION: 97 % | BODY MASS INDEX: 19.14 KG/M2 | WEIGHT: 98 LBS | SYSTOLIC BLOOD PRESSURE: 122 MMHG | HEART RATE: 81 BPM | TEMPERATURE: 97.8 F | DIASTOLIC BLOOD PRESSURE: 70 MMHG

## 2024-03-22 DIAGNOSIS — Q43.1 HIRSCHSPRUNG'S DISEASE: ICD-10-CM

## 2024-03-22 DIAGNOSIS — G80.8 OTHER CEREBRAL PALSY (HCC): Primary | ICD-10-CM

## 2024-03-22 DIAGNOSIS — G40.909 SEIZURE DISORDER (HCC): ICD-10-CM

## 2024-03-22 DIAGNOSIS — F72 SEVERE INTELLECTUAL DISABILITIES: ICD-10-CM

## 2024-03-22 ASSESSMENT — PATIENT HEALTH QUESTIONNAIRE - PHQ9: DEPRESSION UNABLE TO ASSESS: FUNCTIONAL CAPACITY MOTIVATION LIMITS ACCURACY

## 2024-03-22 NOTE — PROGRESS NOTES
Dental issues/impacted teeth  Resolved after dental extraction    Repeatedly tapping his head lightly  Resolved after dental extraction     Severe intellectual disabilities    chronic, getting excellent care    Gastroesophageal reflux disease without esophagitis    chronic, well controlled With use of Nexium through G-tube.  R/B reviewed.  History of Nissen fundoplication and G-tube placement age 6 months.  Last saw Dr. Handley      Hypercalcemia  Fluctuates.  Counseled.Counseled extensively. Differential reviewed, including serious etiologies.  Last check normal    Elevated vitamin D  Was last trended down, asymptomatic, now stable, last check normal    Hyperkalemia    chronically fluctuated.  Hemolysis may play a role.  Asymptomatic.  Monitor.  Last check normal      Other cerebral palsy (HCC)  Counseled.  Risk of complications reviewed.  He receives excellent care at home.  Forms completed  12/28/2022, as well as forms as above today 3/22/2024.  Has been followed by PT/OT    Hirschsprung's disease  Last saw Dr. Handley  Gets occasional foul-smelling diarrhea chronically which Flagyl 250 mg twice a day for 7-day use to resolve.  However did not seem to work last night.  Therefore we increase Flagyl 5 mg twice a day for 10 days, again seen Dr. Key.  Continue per GI    .  Pulmonary infundibular stenosis  Pulmonary stenosis. Continue per cardiology. Last echo apparently did not show it,  but mom not sure that they got a clear picture.     Thrombocytopenia, unspecified (HCC)  Counseled extensively. Differential reviewed, including serious etiologies.  Stable, last check normal     Seizure disorder (HCC)  Continue per neurology.  Mom defers levels to them.  Tolerating medications.  ADRs interactions reviewed.  Seizure precautions.      Other dysphagia  Is strict NPO.  Has G-tube     Health maintenance examination  Counseled at  [facility],  1/23/2024.  Encourage yearly.  Counseled on vaccines.     Rash  Chronic

## 2024-05-15 ENCOUNTER — PATIENT MESSAGE (OUTPATIENT)
Dept: PRIMARY CARE CLINIC | Age: 39
End: 2024-05-15

## 2024-05-15 NOTE — TELEPHONE ENCOUNTER
From: Sreekanth Welsh  To: Dr. Rogelio Landaverde  Sent: 5/15/2024 11:20 AM EDT  Subject: Refill request    Can I please get a refill on the cream from  compounding? Please let me know when it has been called in to [facility] as they will not fill it unless I call after you send refill request. Thank you, Chelsey.  Meredith Welsh

## 2024-06-19 ENCOUNTER — PATIENT MESSAGE (OUTPATIENT)
Dept: PRIMARY CARE CLINIC | Age: 39
End: 2024-06-19
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
## 2024-06-19 NOTE — TELEPHONE ENCOUNTER
From: Sreekanth Welsh  To: Dr. Rogelio Landaverde  Sent: 6/19/2024 2:15 PM EDT  Subject: Ensure    Can you please send [facility] an order stating that it is of medical necessity for Sreekanth to have Ensure and NOT Boost. He can’t take the boost, it gives him bad gas and his stool smells so bad !! I have spoken to Dr. Landaverde several times about it . I sure would appreciate your help on this one.  Sincerely, Meredith Welsh— very frustrated mom

## 2024-06-20 ENCOUNTER — TELEPHONE (OUTPATIENT)
Dept: PRIMARY CARE CLINIC | Age: 39
End: 2024-06-20

## 2024-06-20 NOTE — TELEPHONE ENCOUNTER
From: Sreekanth Welsh  To: Dr. Rogelio Landaverde  Sent: 6/19/2024 8:10 PM EDT  Subject: tick bite    thank you so much for sending the order for the Ensure.  I forgot to state that on May 31st, I removed a tiny tick the size of a pin head from Sreekanth's arm. Do you think that we should have him tested for Lymes's disease etc.? Please advise.  Thank you in advance for your help.  Meredith Welsh--Mom

## 2024-06-20 NOTE — TELEPHONE ENCOUNTER
Can you make addendum to last ov stating patient unable to tolerate boost (causes stool issues 07/25/23 [employer] message)    No issues when using Ensure        Per previous orders  Administered through Gtube  Pump rate 60mls/hr until 1422 mls infused

## 2024-06-20 NOTE — TELEPHONE ENCOUNTER
Check if she thinks tick was embedded for more or less than 36 hours.  Okay for blood test.  Notify if symptoms.  Infection can occur 3-30 days from tick bite and blood test is sometimes not positive for 2 to 6 weeks after infection starts

## 2024-06-20 NOTE — TELEPHONE ENCOUNTER
[facility] calling needing documentation as to why he is being switched to ensure. Also needing to know the pump rate he will be on with ensure.   Fax: [phone]

## 2024-06-20 NOTE — TELEPHONE ENCOUNTER
"Pt states \"I have had alcoholic seizures before.\"  Pt given Ativan.  Pt given juice per his request.  Pt has no other needs at this time.   " Addended.  Thank you

## 2024-07-31 ENCOUNTER — TELEMEDICINE (OUTPATIENT)
Dept: PRIMARY CARE CLINIC | Age: 39
End: 2024-07-31

## 2024-07-31 DIAGNOSIS — Q43.1 HIRSCHSPRUNG'S DISEASE: ICD-10-CM

## 2024-07-31 DIAGNOSIS — F72 SEVERE INTELLECTUAL DISABILITIES: ICD-10-CM

## 2024-07-31 DIAGNOSIS — G80.8 OTHER CEREBRAL PALSY (HCC): Primary | ICD-10-CM

## 2024-07-31 DIAGNOSIS — G40.909 SEIZURE DISORDER (HCC): ICD-10-CM

## 2024-07-31 DIAGNOSIS — K64.4 EXTERNAL HEMORRHOID: ICD-10-CM

## 2024-07-31 SDOH — ECONOMIC STABILITY: FOOD INSECURITY: WITHIN THE PAST 12 MONTHS, THE FOOD YOU BOUGHT JUST DIDN'T LAST AND YOU DIDN'T HAVE MONEY TO GET MORE.: NEVER TRUE

## 2024-07-31 SDOH — ECONOMIC STABILITY: INCOME INSECURITY: HOW HARD IS IT FOR YOU TO PAY FOR THE VERY BASICS LIKE FOOD, HOUSING, MEDICAL CARE, AND HEATING?: NOT HARD AT ALL

## 2024-07-31 SDOH — ECONOMIC STABILITY: FOOD INSECURITY: WITHIN THE PAST 12 MONTHS, YOU WORRIED THAT YOUR FOOD WOULD RUN OUT BEFORE YOU GOT MONEY TO BUY MORE.: NEVER TRUE

## 2024-07-31 ASSESSMENT — PATIENT HEALTH QUESTIONNAIRE - PHQ9
SUM OF ALL RESPONSES TO PHQ QUESTIONS 1-9: 0
2. FEELING DOWN, DEPRESSED OR HOPELESS: NOT AT ALL
1. LITTLE INTEREST OR PLEASURE IN DOING THINGS: NOT AT ALL
SUM OF ALL RESPONSES TO PHQ QUESTIONS 1-9: 0
SUM OF ALL RESPONSES TO PHQ9 QUESTIONS 1 & 2: 0
SUM OF ALL RESPONSES TO PHQ QUESTIONS 1-9: 0
SUM OF ALL RESPONSES TO PHQ QUESTIONS 1-9: 0

## 2024-07-31 NOTE — PROGRESS NOTES
TELEHEALTH EVALUATION -- Audio/Visual (During COVID-19 public health emergency)    Chief Complaint   Patient presents with    Cerebral Palsy     Follow up needs face to face for new wheelchair           HPI:    Sreekanth Welsh (:  1985) has requested an audio/video evaluation for the following concern(s):    Sreekanth presents today with his mom for wheelchair mobility evaluation.  This was performed today, he is 39 years old with cerebral palsy, seizure disorder, severe cognitive impairment, nonverbal with complete dependence with all ADLs.  He has limited function of his lower extremities and upper extremities.  Unable to stand without complete assistance, unable to ambulate.  He has had a wheelchair his entire life, his current wheelchair is 25 years old in need of a new 1.  He cannot walk, stand, transfer or perform a mobility related activity without the use of his wheelchair.  He has severe neuromuscular impairments that impacts his upper and lower extremity function and abilities to perform activities of daily living.  It is my recommendation that he is provided a new wheelchair that meets his mobility needs.    Mom incidentally mentions a chronic external hemorrhoid that bleeds on occasion when wiping.      Past Medical History:  extensive including cerebral palsy, severe mental retardation/cognitive impairment, nonverbal, history of Hirschsprung's Disease, colostomy, feeding tube after Nissen fundoplication for GERD 6 months of age, seizure disorder, history of valvular disease,  mild pulmonic stenosis (Neg 2017 but difficult exam), chronic allergic rhinitis, thrombocytopenia,  hypercalcemia, lympho cytosis, macrocytosis.     FMH: Dad  lymphoma/pneumonia 61     Social: living with mom     Current Outpatient Medications:     esomeprazole Magnesium (NEXIUM) 20 MG PACK, 1 per g tube every day, Disp: 30 packet, Rfl: 12    metroNIDAZOLE (FLAGYL) 500 MG tablet, Take 1 tablet by mouth 2 times daily, Disp:

## 2024-10-09 ENCOUNTER — PATIENT MESSAGE (OUTPATIENT)
Dept: PRIMARY CARE CLINIC | Age: 39
End: 2024-10-09

## 2024-10-09 DIAGNOSIS — G80.8 OTHER CEREBRAL PALSY (HCC): Primary | ICD-10-CM

## 2024-11-15 ENCOUNTER — PATIENT MESSAGE (OUTPATIENT)
Dept: PRIMARY CARE CLINIC | Age: 39
End: 2024-11-15

## 2024-11-15 DIAGNOSIS — F72 SEVERE INTELLECTUAL DISABILITIES: ICD-10-CM

## 2024-11-15 DIAGNOSIS — G80.8 OTHER CEREBRAL PALSY (HCC): Primary | ICD-10-CM

## 2024-11-15 DIAGNOSIS — G40.909 SEIZURE DISORDER (HCC): ICD-10-CM

## 2024-11-20 ENCOUNTER — PATIENT MESSAGE (OUTPATIENT)
Dept: PRIMARY CARE CLINIC | Age: 39
End: 2024-11-20

## 2024-11-20 DIAGNOSIS — K21.9 GASTROESOPHAGEAL REFLUX DISEASE WITHOUT ESOPHAGITIS: ICD-10-CM

## 2024-11-20 RX ORDER — ESOMEPRAZOLE MAGNESIUM 20 MG/1
GRANULE, DELAYED RELEASE ORAL
Qty: 30 PACKET | Refills: 12
Start: 2024-11-20

## 2024-11-20 RX ORDER — METRONIDAZOLE 500 MG/1
TABLET ORAL
Qty: 1 TABLET | Refills: 0
Start: 2024-11-20

## 2024-11-20 RX ORDER — ETHOSUXIMIDE 250 MG/5ML
SOLUTION ORAL
Qty: 300 ML | Refills: 0
Start: 2024-11-20

## 2024-11-20 RX ORDER — LACTOSE-REDUCED FOOD
LIQUID (ML) ORAL
Qty: 1 EACH | Refills: 0
Start: 2024-11-20

## 2024-11-20 RX ORDER — ALOE VERA/COLLAGEN
FOAM (ML) TOPICAL
Qty: 1 EACH | Refills: 0
Start: 2024-11-20

## 2024-12-12 ENCOUNTER — PATIENT MESSAGE (OUTPATIENT)
Dept: PRIMARY CARE CLINIC | Age: 39
End: 2024-12-12

## 2024-12-12 DIAGNOSIS — G80.8 OTHER CEREBRAL PALSY (HCC): Primary | ICD-10-CM

## 2024-12-13 ENCOUNTER — HOSPITAL ENCOUNTER (OUTPATIENT)
Age: 39
Discharge: HOME | End: 2024-12-13
Payer: MEDICARE

## 2024-12-13 ENCOUNTER — OFFICE VISIT (OUTPATIENT)
Dept: PRIMARY CARE CLINIC | Age: 39
End: 2024-12-13

## 2024-12-13 VITALS
OXYGEN SATURATION: 98 % | WEIGHT: 98 LBS | SYSTOLIC BLOOD PRESSURE: 118 MMHG | HEART RATE: 93 BPM | DIASTOLIC BLOOD PRESSURE: 62 MMHG | BODY MASS INDEX: 19.14 KG/M2 | TEMPERATURE: 98 F

## 2024-12-13 DIAGNOSIS — E83.42 HYPOMAGNESEMIA: ICD-10-CM

## 2024-12-13 DIAGNOSIS — E55.9 VITAMIN D DEFICIENCY: ICD-10-CM

## 2024-12-13 DIAGNOSIS — Z93.1 S/P NISSEN FUNDOPLICATION (WITH GASTROSTOMY TUBE PLACEMENT) (HCC): ICD-10-CM

## 2024-12-13 DIAGNOSIS — F72 SEVERE INTELLECTUAL DISABILITIES: ICD-10-CM

## 2024-12-13 DIAGNOSIS — E83.52 HYPERCALCEMIA: ICD-10-CM

## 2024-12-13 DIAGNOSIS — Q43.1 HIRSCHSPRUNG'S DISEASE: ICD-10-CM

## 2024-12-13 DIAGNOSIS — G40.909 SEIZURE DISORDER (HCC): ICD-10-CM

## 2024-12-13 DIAGNOSIS — G80.8 OTHER CEREBRAL PALSY (HCC): Primary | ICD-10-CM

## 2024-12-13 DIAGNOSIS — D69.6 THROMBOCYTOPENIA, UNSPECIFIED (HCC): ICD-10-CM

## 2024-12-13 DIAGNOSIS — E53.8 VITAMIN B12 DEFICIENCY: ICD-10-CM

## 2024-12-13 DIAGNOSIS — Z11.1 TUBERCULOSIS SCREENING: ICD-10-CM

## 2024-12-13 DIAGNOSIS — K21.9 GASTROESOPHAGEAL REFLUX DISEASE WITHOUT ESOPHAGITIS: ICD-10-CM

## 2024-12-13 DIAGNOSIS — G80.8 OTHER CEREBRAL PALSY (HCC): ICD-10-CM

## 2024-12-13 PROBLEM — A41.9 SEPSIS (HCC): Status: RESOLVED | Noted: 2022-11-22 | Resolved: 2024-12-13

## 2024-12-13 LAB
25(OH)D3 SERPL-MCNC: 65.9 NG/ML (ref 30–100)
ALBUMIN SERPL-MCNC: 4.6 G/DL (ref 3.5–5.2)
ALP SERPL-CCNC: 125 U/L (ref 40–129)
ALT SERPL-CCNC: 22 U/L (ref 0–40)
ANION GAP SERPL CALCULATED.3IONS-SCNC: 13 MMOL/L (ref 7–16)
AST SERPL-CCNC: 32 U/L (ref 0–39)
BASOPHILS # BLD: 0.05 K/UL (ref 0–0.2)
BASOPHILS NFR BLD: 0 % (ref 0–2)
BILIRUB SERPL-MCNC: 0.2 MG/DL (ref 0–1.2)
BUN SERPL-MCNC: 11 MG/DL (ref 6–20)
CALCIUM SERPL-MCNC: 10.3 MG/DL (ref 8.6–10.2)
CHLORIDE SERPL-SCNC: 98 MMOL/L (ref 98–107)
CO2 SERPL-SCNC: 27 MMOL/L (ref 22–29)
CREAT SERPL-MCNC: 0.6 MG/DL (ref 0.7–1.2)
EOSINOPHIL # BLD: 0.04 K/UL (ref 0.05–0.5)
EOSINOPHILS RELATIVE PERCENT: 0 % (ref 0–6)
ERYTHROCYTE [DISTWIDTH] IN BLOOD BY AUTOMATED COUNT: 12.2 % (ref 11.5–15)
FOLATE SERPL-MCNC: >20 NG/ML (ref 4.8–24.2)
GFR, ESTIMATED: >90 ML/MIN/1.73M2
GLUCOSE SERPL-MCNC: 83 MG/DL (ref 74–99)
HCT VFR BLD AUTO: 43.8 % (ref 37–54)
HGB BLD-MCNC: 15.5 G/DL (ref 12.5–16.5)
IMM GRANULOCYTES # BLD AUTO: 0.07 K/UL (ref 0–0.58)
IMM GRANULOCYTES NFR BLD: 1 % (ref 0–5)
LYMPHOCYTES NFR BLD: 4.01 K/UL (ref 1.5–4)
LYMPHOCYTES RELATIVE PERCENT: 35 % (ref 20–42)
MAGNESIUM SERPL-MCNC: 2.2 MG/DL (ref 1.6–2.6)
MCH RBC QN AUTO: 34.1 PG (ref 26–35)
MCHC RBC AUTO-ENTMCNC: 35.4 G/DL (ref 32–34.5)
MCV RBC AUTO: 96.5 FL (ref 80–99.9)
MONOCYTES NFR BLD: 1.23 K/UL (ref 0.1–0.95)
MONOCYTES NFR BLD: 11 % (ref 2–12)
NEUTROPHILS NFR BLD: 53 % (ref 43–80)
NEUTS SEG NFR BLD: 5.98 K/UL (ref 1.8–7.3)
PLATELET # BLD AUTO: 234 K/UL (ref 130–450)
PLATELET CONFIRMATION: NORMAL
PMV BLD AUTO: 13.4 FL (ref 7–12)
POTASSIUM SERPL-SCNC: 4.5 MMOL/L (ref 3.5–5)
PROT SERPL-MCNC: 8.6 G/DL (ref 6.4–8.3)
RBC # BLD AUTO: 4.54 M/UL (ref 3.8–5.8)
SODIUM SERPL-SCNC: 138 MMOL/L (ref 132–146)
TSH SERPL DL<=0.05 MIU/L-ACNC: 2.31 UIU/ML (ref 0.27–4.2)
VIT B12 SERPL-MCNC: >2000 PG/ML (ref 211–946)
WBC OTHER # BLD: 11.4 K/UL (ref 4.5–11.5)

## 2024-12-13 PROCEDURE — 80164 ASSAY DIPROPYLACETIC ACD TOT: CPT

## 2024-12-13 PROCEDURE — 85025 COMPLETE CBC W/AUTO DIFF WBC: CPT

## 2024-12-13 PROCEDURE — 82306 VITAMIN D 25 HYDROXY: CPT

## 2024-12-13 PROCEDURE — 36415 COLL VENOUS BLD VENIPUNCTURE: CPT

## 2024-12-13 PROCEDURE — 83735 ASSAY OF MAGNESIUM: CPT

## 2024-12-13 PROCEDURE — 84443 ASSAY THYROID STIM HORMONE: CPT

## 2024-12-13 PROCEDURE — 82607 VITAMIN B-12: CPT

## 2024-12-13 PROCEDURE — 80053 COMPREHEN METABOLIC PANEL: CPT

## 2024-12-13 PROCEDURE — 82746 ASSAY OF FOLIC ACID SERUM: CPT

## 2024-12-13 PROCEDURE — 80165 DIPROPYLACETIC ACID FREE: CPT

## 2024-12-13 NOTE — PROGRESS NOTES
work.  Forms completed           As long as symptoms steadily improve/resolve, and medical conditions follow the expected course, FU as below, sooner PRN.    Return in about 1 year (around 12/13/2025), or if symptoms worsen or fail to improve, for physical.                 Educational materials and/or home exercises printed for patient's review and were included in patient instructions on his/her After Visit Summary and given to patient at the end of visit.       After discussion, patient and/or guardian verbalizes understanding, agrees, feels comfortable with and wishes to proceed with above treatment plan. Call for any pending results, FU sooner if abnormal, as needed or if any current symptoms persist/worsen.      Advised patient to call with any new medication issues, and read all Rx info from pharmacy to assure aware of all possible risks and side effects of medication before taking.     Reviewed age and gender appropriate health screening exams and vaccinations.  Advised patient regarding importance of keeping up with recommended health maintenance and to schedule as soon as possible if overdue, as this is important in assessing for undiagnosed pathology, especially cancer, as well as protecting against potentially harmful/life threatening disease.          Patient and/or guardian verbalizes understanding and agrees with above counseling, assessment and plan.     All questions answered.          Signs and symptoms to watch for discussed, serious signs and symptoms reviewed.  ER if any.               Rogelio Landaverde MD    Patients are advised to check with insurance company to ensure coverage and to fully understand benefits and cost prior to any testing.  This note was created with the assistance of voice recognition software.  Document was reviewed however may contain grammatical errors.This note or partial portions of this note may have been created using a copy forward or copy paste feature but these 
no

## 2024-12-16 ENCOUNTER — PATIENT MESSAGE (OUTPATIENT)
Dept: PRIMARY CARE CLINIC | Age: 39
End: 2024-12-16

## 2024-12-16 DIAGNOSIS — B96.89 ACUTE BACTERIAL SINUSITIS: Primary | ICD-10-CM

## 2024-12-16 DIAGNOSIS — J01.90 ACUTE BACTERIAL SINUSITIS: Primary | ICD-10-CM

## 2024-12-16 RX ORDER — AMOXICILLIN AND CLAVULANATE POTASSIUM 400; 57 MG/5ML; MG/5ML
800 POWDER, FOR SUSPENSION ORAL 2 TIMES DAILY
Qty: 200 ML | Refills: 0 | Status: SHIPPED | OUTPATIENT
Start: 2024-12-16 | End: 2024-12-26

## 2024-12-16 NOTE — TELEPHONE ENCOUNTER
Okay with Augmentin suspension- please send to pharmacy       Is asking about the vitamin b12 level >2000  Not on vitamin b12

## 2024-12-16 NOTE — TELEPHONE ENCOUNTER
Is he taking B12?  May be getting it in the supplemental drink.  Elevated is typically not a concern, we just do not want him deficient.    Sent Augmentin.  Standard precautions.  See if symptoms persist worsen, express care if needed, as always ER for any serious signs or symptoms

## 2024-12-16 NOTE — TELEPHONE ENCOUNTER
I see he has taken Augmentin suspension successfully in the past.  Make sure no history of intolerance or allergy.  I can send this if they would like.  Happy to see again or express care if needed.  As always ER for any serious signs or symptoms.  Send back

## 2024-12-18 LAB
VALPROIC ACID % FREE: 10 % (ref 5–18)
VALPROIC ACID TOTAL: 91 UG/ML (ref 50–125)
VALPROIC ACID, FREE: 9 UG/ML (ref 7–23)

## 2024-12-19 ENCOUNTER — HOSPITAL ENCOUNTER (OUTPATIENT)
Age: 39
Discharge: HOME | End: 2024-12-19
Payer: MEDICARE
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
## 2024-12-19 DIAGNOSIS — Z11.1 TUBERCULOSIS SCREENING: ICD-10-CM

## 2024-12-19 PROCEDURE — 36415 COLL VENOUS BLD VENIPUNCTURE: CPT

## 2024-12-19 PROCEDURE — 86618 LYME DISEASE ANTIBODY: CPT

## 2024-12-19 PROCEDURE — 86481 TB AG RESPONSE T-CELL SUSP: CPT

## 2024-12-23 LAB
B BURGDOR AB SER IA-ACNC: 1.67 IV
B BURGDOR IGG SERPL QL IA: 0.36 IV
B BURGDOR IGG+IGM SER IA-IMP: NEGATIVE
B BURGDOR IGM SERPL QL IA: 0.29 IV
T SPOT TB TEST: NORMAL

## 2024-12-27 ENCOUNTER — PATIENT MESSAGE (OUTPATIENT)
Dept: PRIMARY CARE CLINIC | Age: 39
End: 2024-12-27

## 2025-01-06 ENCOUNTER — OFFICE VISIT (OUTPATIENT)
Dept: PRIMARY CARE CLINIC | Age: 40
End: 2025-01-06
Payer: MEDICARE

## 2025-01-06 VITALS
HEIGHT: 60 IN | WEIGHT: 104 LBS | DIASTOLIC BLOOD PRESSURE: 68 MMHG | SYSTOLIC BLOOD PRESSURE: 126 MMHG | BODY MASS INDEX: 20.42 KG/M2 | TEMPERATURE: 97.7 F

## 2025-01-06 DIAGNOSIS — G40.909 SEIZURE DISORDER (HCC): ICD-10-CM

## 2025-01-06 DIAGNOSIS — N40.0 ENLARGED PROSTATE: ICD-10-CM

## 2025-01-06 DIAGNOSIS — Z93.1 S/P NISSEN FUNDOPLICATION (WITH GASTROSTOMY TUBE PLACEMENT) (HCC): ICD-10-CM

## 2025-01-06 DIAGNOSIS — R76.8 POSITIVE LYME DISEASE SEROLOGY: ICD-10-CM

## 2025-01-06 DIAGNOSIS — J30.89 ALLERGIC RHINITIS DUE TO OTHER ALLERGIC TRIGGER, UNSPECIFIED SEASONALITY: ICD-10-CM

## 2025-01-06 DIAGNOSIS — G80.8 OTHER CEREBRAL PALSY (HCC): ICD-10-CM

## 2025-01-06 DIAGNOSIS — J02.9 PHARYNGITIS, UNSPECIFIED ETIOLOGY: ICD-10-CM

## 2025-01-06 DIAGNOSIS — B37.0 THRUSH: Primary | ICD-10-CM

## 2025-01-06 DIAGNOSIS — R33.9 URINE RETENTION: ICD-10-CM

## 2025-01-06 LAB — S PYO AG THROAT QL: NORMAL

## 2025-01-06 PROCEDURE — 87880 STREP A ASSAY W/OPTIC: CPT | Performed by: FAMILY MEDICINE

## 2025-01-06 PROCEDURE — 1036F TOBACCO NON-USER: CPT | Performed by: FAMILY MEDICINE

## 2025-01-06 PROCEDURE — 99214 OFFICE O/P EST MOD 30 MIN: CPT | Performed by: FAMILY MEDICINE

## 2025-01-06 PROCEDURE — G8427 DOCREV CUR MEDS BY ELIG CLIN: HCPCS | Performed by: FAMILY MEDICINE

## 2025-01-06 PROCEDURE — G8420 CALC BMI NORM PARAMETERS: HCPCS | Performed by: FAMILY MEDICINE

## 2025-01-06 PROCEDURE — G2211 COMPLEX E/M VISIT ADD ON: HCPCS | Performed by: FAMILY MEDICINE

## 2025-01-06 RX ORDER — NYSTATIN 100000 [USP'U]/ML
SUSPENSION ORAL
Qty: 280 ML | Refills: 0 | Status: SHIPPED | OUTPATIENT
Start: 2025-01-06

## 2025-01-06 ASSESSMENT — PATIENT HEALTH QUESTIONNAIRE - PHQ9: DEPRESSION UNABLE TO ASSESS: FUNCTIONAL CAPACITY MOTIVATION LIMITS ACCURACY

## 2025-01-06 NOTE — PROGRESS NOTES
Cardiology at [facility] 2017, said no Pulmonary stenosis despite being told that his whole life, no cardiac issues, fu prn.    Has 6 cans of nutrition per day through the G-tube.  Feeds have changed based on shortage      Does not seem to have any other symptomatology consistent with headaches or acute neurologic deficit, does not portray symptoms of shortness of breath, chest pain,, acute cough, no abdominal discomfort, vomiting, no change in bowels , no melena or hematochezia..  No change in urinary habits.  Was previously assessed by Dr. Ramirez with a negative ultrasound and doing well at this time.  No gross hematuria. He has had no issues since. No rash.    Past CT showed enlarged prostate, considering this and urine symptoms willing to see Dr. Ramirez now      Past Medical History:  extensive including cerebral palsy, severe mental retardation/cognitive impairment, nonverbal, history of Hirschsprung's Disease, colostomy, feeding tube after Nissen fundoplication for GERD 6 months of age, seizure disorder, history of valvular disease,  mild pulmonic stenosis (Neg 2017 but difficult exam), chronic allergic rhinitis, thrombocytopenia,  hypercalcemia, lympho cytosis, macrocytosis.     FMH: Dad  lymphoma/pneumonia 61     Social: living with mom-moving into group home         Most Recent Labs  CBC  Lab Results   Component Value Date/Time    WBC 11.4 2024 04:00 PM    WBC 7.2 2024 04:00 AM    WBC 13.7 2023 08:51 PM    RBC 4.54 2024 04:00 PM    RBC 4.61 2024 04:00 AM    RBC 4.62 2023 08:51 PM    HGB 15.5 2024 04:00 PM    HGB 15.3 2024 04:00 AM    HGB 15.5 2023 08:51 PM    HCT 43.8 2024 04:00 PM    HCT 44.6 2024 04:00 AM    HCT 44.2 2023 08:51 PM    MCV 96.5 2024 04:00 PM    MCV 96.7 2024 04:00 AM    MCV 95.7 2023 08:51 PM     2024 04:00 PM     2024 04:00 AM     2023 08:51 PM      CMP  Lab Results

## 2025-01-09 LAB
CULTURE: NORMAL
SPECIMEN DESCRIPTION: NORMAL

## 2025-01-10 ENCOUNTER — PATIENT MESSAGE (OUTPATIENT)
Dept: PRIMARY CARE CLINIC | Age: 40
End: 2025-01-10

## 2025-01-10 LAB
BORRELIA BURGDORFERI AB IGG: 0.47 IV
BORRELIA BURGDORFERI AB IGM: 0.3 IV
BORRELIA BURGDORFERI ABS TOTAL: 2.13 IV
LYME DIS 2-TIER INTERP: NEGATIVE

## 2025-01-10 NOTE — RESULT ENCOUNTER NOTE
Initial screening for Lyme is positive but confirmatory/tier 2 screening is negative-antibodies detected by first tier screening not confirmed.  Generally felt to be a negative test.  If would ever want an ID consult to be safe happy to refer unfortunately there are no local any longer.  Will have to be [place].

## 2025-01-13 NOTE — RESULT ENCOUNTER NOTE
I called mom to review the labs with her.  Discussed FP/FM.  Counseled on sensitivity/specificity's of labs.  Offered ID consult.  She will let us know if she desires further E/T.

## 2025-01-23 ENCOUNTER — PATIENT MESSAGE (OUTPATIENT)
Dept: PRIMARY CARE CLINIC | Age: 40
End: 2025-01-23

## 2025-01-31 ENCOUNTER — PATIENT MESSAGE (OUTPATIENT)
Dept: PRIMARY CARE CLINIC | Age: 40
End: 2025-01-31

## 2025-01-31 NOTE — TELEPHONE ENCOUNTER
Good morning,  I called [employer], the company that is to supply Sreekanth's hospital bed.     They state that the notes sent from your office were not sufficient for Medicare verbiage. I am wondering exactly how long it was going to take them to let me know!!!  I digress, sorry.  The verbiage that needs to be added to the notes is the following:  The patient needs a hospital bed due to positioning that is not feasible with a regular bed. The head of the bed needs raised to prevent aspiration and for frequent body positioning.  Can you please add these sentences to the notes and resend to [facility] fax  [phone]  attn: Connor     Thank you in advance.  Meredith Blaise  Well, at least today I learned how to spell verbiage and feasible.                        Can you do an addendum to last office visit to include the correct information.

## 2025-02-07 ENCOUNTER — TELEPHONE (OUTPATIENT)
Dept: PRIMARY CARE CLINIC | Age: 40
End: 2025-02-07

## 2025-02-07 DIAGNOSIS — Z93.1 S/P NISSEN FUNDOPLICATION (WITH GASTROSTOMY TUBE PLACEMENT) (HCC): Primary | ICD-10-CM

## 2025-02-07 DIAGNOSIS — K21.9 GASTROESOPHAGEAL REFLUX DISEASE WITHOUT ESOPHAGITIS: ICD-10-CM

## 2025-03-13 ENCOUNTER — PATIENT MESSAGE (OUTPATIENT)
Dept: PRIMARY CARE CLINIC | Age: 40
End: 2025-03-13

## 2025-03-13 DIAGNOSIS — G80.8 OTHER CEREBRAL PALSY (HCC): Primary | ICD-10-CM

## 2025-03-17 ENCOUNTER — TELEPHONE (OUTPATIENT)
Dept: PRIMARY CARE CLINIC | Age: 40
End: 2025-03-17

## 2025-03-17 RX ORDER — METRONIDAZOLE 500 MG/1
TABLET ORAL
Qty: 1 TABLET | Refills: 0 | Status: CANCELLED | OUTPATIENT
Start: 2025-03-17

## 2025-03-17 NOTE — TELEPHONE ENCOUNTER
11/20/24 mom had requested all medications that have specific instructions be sent to [place]  In her original message it states   Flagyl- 1 pill crushed and dissolved in 4oz. water via g-tube twice daily--8am and 8pm    Mom is now saying medication is prn  Antonios is unable to give prn antibiotics so needs clarification.     Looks like previous sig was twice a day for 10 days. New script pended with updated sig

## 2025-03-18 ENCOUNTER — TELEPHONE (OUTPATIENT)
Dept: PRIMARY CARE CLINIC | Age: 40
End: 2025-03-18

## 2025-03-18 NOTE — TELEPHONE ENCOUNTER
Patient will need a \"wheelchair specific mobility appointment\". They are faxing over form of what is needing completed for this [facility]. Mom is going to call and schedule this appointment. Sending this to you to keep an eye out for this fax.

## 2025-03-26 ENCOUNTER — PATIENT MESSAGE (OUTPATIENT)
Dept: PRIMARY CARE CLINIC | Age: 40
End: 2025-03-26

## 2025-03-26 NOTE — TELEPHONE ENCOUNTER
Mom asking for orders     liquid Acetaminophen 160 mg/5ml for Sreekanth for pain (he currently takes 15ml every 4 hours)     liquid Ibuprofen 100 mg/5ml for fever over 99 degrees. He currently takes 15ml every 6-8 hours *order pended    Pedialyte thru g tube and enteral feeding pump at a rate of 105ml/hour for flu like/upset stomach symptoms. (I am not sure how to word that) Also, would you be able to figure out exactly how much he should have in one day?         Script to be sent to [facility] pharmacy

## 2025-03-26 NOTE — TELEPHONE ENCOUNTER
Ibuprofen pended.  Please write the acetaminophen and Pedialyte prescription.  Double check that mom is comfortable having these \"prn\" orders.  My concern is sometimes they merely act on these orders and do not seek medical care when appropriate for flulike symptoms, fever etc. which can represent dangerous situations

## 2025-03-27 ENCOUNTER — PATIENT MESSAGE (OUTPATIENT)
Dept: PRIMARY CARE CLINIC | Age: 40
End: 2025-03-27

## 2025-03-27 RX ORDER — NYSTATIN 100000 [USP'U]/G
POWDER TOPICAL
Qty: 60 G | Refills: 3 | Status: SHIPPED | OUTPATIENT
Start: 2025-03-27

## 2025-03-27 RX ORDER — IBUPROFEN 100 MG/5ML
SUSPENSION ORAL
Qty: 240 ML | Refills: 3 | Status: SHIPPED | OUTPATIENT
Start: 2025-03-27

## 2025-03-27 RX ORDER — ACETAMINOPHEN 160 MG/5ML
SUSPENSION ORAL
Qty: 240 ML | Refills: 3 | Status: SHIPPED | OUTPATIENT
Start: 2025-03-27

## 2025-03-27 NOTE — TELEPHONE ENCOUNTER
Per mom    \"  Good morning. I spoke to [facility] nurse. The workers have to ask a nurse before they can give any prn med. so I feel ok about the Tylenol and ibuprofen  as a prn order.As far as the pedialyte is concerned, we decided that the nurse would call you for advice when he has a stomach issue. No pedialyte prn order needed. Please fax the diaper order back to [place]. 2 numbers given.  [phone].  Or [phone]. Aricn [employer] or [facility].    In addition , may we please have a prn order for nystatin powder sent to [facility] pharmacy the direction need to say that it can be used for diaper rash or gtube irritation.(it can be used for gtube irritation, correct?). Thank you so very very much. I’m sure this isn’t the last note I write. I am so sick of this. And I’m sure you are too. \"

## 2025-03-27 NOTE — TELEPHONE ENCOUNTER
No problem.  Ibuprofen sent.  Please pend the nystatin order, acetaminophen order in diaper order as requested.

## 2025-04-14 ENCOUNTER — PATIENT MESSAGE (OUTPATIENT)
Dept: PRIMARY CARE CLINIC | Age: 40
End: 2025-04-14

## 2025-05-16 ENCOUNTER — HOSPITAL ENCOUNTER (EMERGENCY)
Age: 40
Discharge: HOME | End: 2025-05-16
Payer: MEDICARE

## 2025-05-16 ENCOUNTER — APPOINTMENT (OUTPATIENT)
Dept: GENERAL RADIOLOGY | Age: 40
End: 2025-05-16
Payer: MEDICARE

## 2025-05-16 VITALS — RESPIRATION RATE: 20 BRPM | HEART RATE: 100 BPM | TEMPERATURE: 98 F | OXYGEN SATURATION: 94 %

## 2025-05-16 DIAGNOSIS — M79.672 LEFT FOOT PAIN: Primary | ICD-10-CM

## 2025-05-16 PROCEDURE — 73590 X-RAY EXAM OF LOWER LEG: CPT

## 2025-05-16 PROCEDURE — 99283 EMERGENCY DEPT VISIT LOW MDM: CPT

## 2025-05-16 PROCEDURE — 73620 X-RAY EXAM OF FOOT: CPT

## 2025-05-16 PROCEDURE — 6370000000 HC RX 637 (ALT 250 FOR IP): Performed by: NURSE PRACTITIONER

## 2025-05-16 RX ORDER — ACETAMINOPHEN 160 MG/5ML
650 LIQUID ORAL ONCE
Status: COMPLETED | OUTPATIENT
Start: 2025-05-16 | End: 2025-05-16

## 2025-05-16 RX ADMIN — ACETAMINOPHEN 650 MG: 650 SOLUTION ORAL at 18:16

## 2025-05-16 ASSESSMENT — PAIN - FUNCTIONAL ASSESSMENT: PAIN_FUNCTIONAL_ASSESSMENT: ADULT NONVERBAL PAIN SCALE (NPVS)

## 2025-05-16 ASSESSMENT — LIFESTYLE VARIABLES
HOW MANY STANDARD DRINKS CONTAINING ALCOHOL DO YOU HAVE ON A TYPICAL DAY: PATIENT DOES NOT DRINK
HOW OFTEN DO YOU HAVE A DRINK CONTAINING ALCOHOL: NEVER

## 2025-05-17 NOTE — ED PROVIDER NOTES
[facility]  Department of Emergency Medicine   ED  Encounter Note  Admit Date/RoomTime: 2025  5:42 PM  [facility]    NAME: Sreekanth Welsh  : 1985  MRN: [id number]     Chief Complaint:  Ankle Pain (Left foot and ankle pain, unknown injury pt from group home)    History of Present Illness         Sreekanth Welsh is a 39 y.o. old male presenting to the emergency department by private vehicle, for possible injury to the left foot or ankle.  Patient has a history of cerebral palsy and MR therefore history was obtained by mother at bedside.  She states he recently moved into a group home so she did not witness any fall or injury however the group home called her this morning and stated that he refused to bear any weight on his left leg.  She states he is localizing the pain to his foot/ankle.  He is wheelchair-bound however does stand and bear weight at times to pivot.  She does admit that he did that to get into the car prior to arrival however seems irritable.  He has not taken anything for pain as the group home would not administer it.  No other complaints or concerns per mother.    ROS   Pertinent positives and negatives are stated within HPI, all other systems reviewed and are negative.    Past Medical History:  has a past medical history of Cerebral palsy (HCC), Hirschsprung disease, Hypospadias, Mental retardation, and Pulmonic stenosis.    Surgical History:  has a past surgical history that includes colostomy; Gastrostomy tube placement; Patent ductus arterious ligation; Foot surgery (Bilateral); Testicle removal; Gastric fundoplication; Rectal surgery; and Dental surgery (N/A, 2024).    Social History:  reports that he has never smoked. He has never used smokeless tobacco. He reports that he does not drink alcohol and does not use drugs.    Family History: family history is not on file.     Allergies: Environmental/seasonal, Lactose, Lactose intolerance (gi),

## 2025-07-14 ENCOUNTER — PATIENT MESSAGE (OUTPATIENT)
Dept: PRIMARY CARE CLINIC | Age: 40
End: 2025-07-14

## 2025-08-11 ENCOUNTER — PATIENT MESSAGE (OUTPATIENT)
Dept: PRIMARY CARE CLINIC | Age: 40
End: 2025-08-11

## 2025-08-11 DIAGNOSIS — Q54.9 HYPOSPADIAS, UNSPECIFIED HYPOSPADIAS TYPE: ICD-10-CM

## 2025-08-11 DIAGNOSIS — R32 URINARY INCONTINENCE, UNSPECIFIED TYPE: ICD-10-CM

## 2025-08-11 DIAGNOSIS — F72 SEVERE INTELLECTUAL DISABILITIES: Primary | ICD-10-CM

## (undated) DEVICE — GLOVE ORANGE PI 7 1/2   MSG9075

## (undated) DEVICE — GARMENT,MEDLINE,DVT,INT,CALF,MED, GEN2: Brand: MEDLINE

## (undated) DEVICE — NEEDLE HYPO 21GA L1.5IN GRN POLYPR HUB S STL REG BVL STR

## (undated) DEVICE — MICRODISSECTION NEEDLE STRAIGHT SLEEVE: Brand: COLORADO

## (undated) DEVICE — SYRINGE MED 10ML TRNSLUC BRL PLUNG BLK MRK POLYPR CTRL

## (undated) DEVICE — BANDAGE,GAUZE,4.5"X4.1YD,STERILE,LF: Brand: MEDLINE

## (undated) DEVICE — PENCIL ES L3M BTTN SWCH HOLSTER W/ BLDE ELECTRD EDGE

## (undated) DEVICE — ELECTRODE PT RET AD L9FT HI MOIST COND ADH HYDRGEL CORDED

## (undated) DEVICE — BLADE,STAINLESS-STEEL,15,STRL,DISPOSABLE: Brand: MEDLINE

## (undated) DEVICE — DENTAL: Brand: MEDLINE INDUSTRIES, INC.

## (undated) DEVICE — 4.0MM OVAL SOLID CARBIDE BUR MEDIUM